# Patient Record
Sex: MALE | Race: WHITE | NOT HISPANIC OR LATINO | Employment: UNEMPLOYED | URBAN - METROPOLITAN AREA
[De-identification: names, ages, dates, MRNs, and addresses within clinical notes are randomized per-mention and may not be internally consistent; named-entity substitution may affect disease eponyms.]

---

## 2018-04-09 ENCOUNTER — HOSPITAL ENCOUNTER (EMERGENCY)
Facility: HOSPITAL | Age: 38
End: 2018-04-10
Attending: EMERGENCY MEDICINE | Admitting: EMERGENCY MEDICINE

## 2018-04-09 DIAGNOSIS — F10.920 ALCOHOLIC INTOXICATION WITHOUT COMPLICATION (HCC): Primary | ICD-10-CM

## 2018-04-09 DIAGNOSIS — F32.A DEPRESSION, UNSPECIFIED DEPRESSION TYPE: ICD-10-CM

## 2018-04-09 LAB
ALBUMIN SERPL-MCNC: 5.4 G/DL (ref 3.5–5.2)
ALBUMIN/GLOB SERPL: 1.7 G/DL
ALP SERPL-CCNC: 103 U/L (ref 39–117)
ALT SERPL W P-5'-P-CCNC: 40 U/L (ref 1–41)
AMPHET+METHAMPHET UR QL: NEGATIVE
ANION GAP SERPL CALCULATED.3IONS-SCNC: 15.6 MMOL/L
AST SERPL-CCNC: 28 U/L (ref 1–40)
BARBITURATES UR QL SCN: NEGATIVE
BASOPHILS # BLD AUTO: 0.02 10*3/MM3 (ref 0–0.2)
BASOPHILS NFR BLD AUTO: 0.2 % (ref 0–1.5)
BENZODIAZ UR QL SCN: NEGATIVE
BILIRUB SERPL-MCNC: 0.3 MG/DL (ref 0.1–1.2)
BUN BLD-MCNC: 10 MG/DL (ref 6–20)
BUN/CREAT SERPL: 11.6 (ref 7–25)
CALCIUM SPEC-SCNC: 9.8 MG/DL (ref 8.6–10.5)
CANNABINOIDS SERPL QL: NEGATIVE
CHLORIDE SERPL-SCNC: 103 MMOL/L (ref 98–107)
CO2 SERPL-SCNC: 29.4 MMOL/L (ref 22–29)
COCAINE UR QL: NEGATIVE
CREAT BLD-MCNC: 0.86 MG/DL (ref 0.76–1.27)
DEPRECATED RDW RBC AUTO: 42.6 FL (ref 37–54)
EOSINOPHIL # BLD AUTO: 0.06 10*3/MM3 (ref 0–0.7)
EOSINOPHIL NFR BLD AUTO: 0.7 % (ref 0.3–6.2)
ERYTHROCYTE [DISTWIDTH] IN BLOOD BY AUTOMATED COUNT: 12.9 % (ref 11.5–14.5)
ETHANOL BLD-MCNC: 298 MG/DL (ref 0–10)
ETHANOL UR QL: 0.3 %
GFR SERPL CREATININE-BSD FRML MDRD: 101 ML/MIN/1.73
GLOBULIN UR ELPH-MCNC: 3.2 GM/DL
GLUCOSE BLD-MCNC: 113 MG/DL (ref 65–99)
HCT VFR BLD AUTO: 49.3 % (ref 40.4–52.2)
HGB BLD-MCNC: 16.7 G/DL (ref 13.7–17.6)
IMM GRANULOCYTES # BLD: 0.05 10*3/MM3 (ref 0–0.03)
IMM GRANULOCYTES NFR BLD: 0.6 % (ref 0–0.5)
LYMPHOCYTES # BLD AUTO: 1.7 10*3/MM3 (ref 0.9–4.8)
LYMPHOCYTES NFR BLD AUTO: 19.7 % (ref 19.6–45.3)
MAGNESIUM SERPL-MCNC: 2.7 MG/DL (ref 1.6–2.6)
MCH RBC QN AUTO: 30.9 PG (ref 27–32.7)
MCHC RBC AUTO-ENTMCNC: 33.9 G/DL (ref 32.6–36.4)
MCV RBC AUTO: 91.3 FL (ref 79.8–96.2)
METHADONE UR QL SCN: NEGATIVE
MONOCYTES # BLD AUTO: 0.36 10*3/MM3 (ref 0.2–1.2)
MONOCYTES NFR BLD AUTO: 4.2 % (ref 5–12)
NEUTROPHILS # BLD AUTO: 6.43 10*3/MM3 (ref 1.9–8.1)
NEUTROPHILS NFR BLD AUTO: 74.6 % (ref 42.7–76)
OPIATES UR QL: NEGATIVE
OXYCODONE UR QL SCN: NEGATIVE
PLATELET # BLD AUTO: 200 10*3/MM3 (ref 140–500)
PMV BLD AUTO: 10.1 FL (ref 6–12)
POTASSIUM BLD-SCNC: 4.1 MMOL/L (ref 3.5–5.2)
PROT SERPL-MCNC: 8.6 G/DL (ref 6–8.5)
RBC # BLD AUTO: 5.4 10*6/MM3 (ref 4.6–6)
SODIUM BLD-SCNC: 148 MMOL/L (ref 136–145)
WBC NRBC COR # BLD: 8.62 10*3/MM3 (ref 4.5–10.7)

## 2018-04-09 PROCEDURE — 80307 DRUG TEST PRSMV CHEM ANLYZR: CPT | Performed by: PHYSICIAN ASSISTANT

## 2018-04-09 PROCEDURE — 25010000002 MAGNESIUM SULFATE PER 500 MG OF MAGNESIUM: Performed by: PHYSICIAN ASSISTANT

## 2018-04-09 PROCEDURE — 25010000002 THIAMINE PER 100 MG: Performed by: PHYSICIAN ASSISTANT

## 2018-04-09 PROCEDURE — 80053 COMPREHEN METABOLIC PANEL: CPT | Performed by: PHYSICIAN ASSISTANT

## 2018-04-09 PROCEDURE — 99285 EMERGENCY DEPT VISIT HI MDM: CPT

## 2018-04-09 PROCEDURE — 96365 THER/PROPH/DIAG IV INF INIT: CPT

## 2018-04-09 PROCEDURE — 83735 ASSAY OF MAGNESIUM: CPT | Performed by: PHYSICIAN ASSISTANT

## 2018-04-09 PROCEDURE — 85025 COMPLETE CBC W/AUTO DIFF WBC: CPT | Performed by: PHYSICIAN ASSISTANT

## 2018-04-09 RX ORDER — SODIUM CHLORIDE 0.9 % (FLUSH) 0.9 %
10 SYRINGE (ML) INJECTION AS NEEDED
Status: DISCONTINUED | OUTPATIENT
Start: 2018-04-09 | End: 2018-04-10 | Stop reason: HOSPADM

## 2018-04-09 RX ADMIN — FOLIC ACID 1000 ML/HR: 5 INJECTION, SOLUTION INTRAMUSCULAR; INTRAVENOUS; SUBCUTANEOUS at 22:44

## 2018-04-10 ENCOUNTER — HOSPITAL ENCOUNTER (INPATIENT)
Facility: HOSPITAL | Age: 38
LOS: 4 days | Discharge: HOME | End: 2018-04-14
Attending: SPECIALIST | Admitting: SPECIALIST

## 2018-04-10 VITALS
TEMPERATURE: 97.3 F | BODY MASS INDEX: 28.63 KG/M2 | OXYGEN SATURATION: 93 % | HEART RATE: 98 BPM | SYSTOLIC BLOOD PRESSURE: 138 MMHG | HEIGHT: 70 IN | RESPIRATION RATE: 18 BRPM | WEIGHT: 200 LBS | DIASTOLIC BLOOD PRESSURE: 98 MMHG

## 2018-04-10 PROBLEM — F32.A DEPRESSION WITH SUICIDAL IDEATION: Status: ACTIVE | Noted: 2018-04-10

## 2018-04-10 PROBLEM — R45.851 SUICIDAL IDEATIONS: Status: ACTIVE | Noted: 2018-04-10

## 2018-04-10 PROBLEM — R45.851 DEPRESSION WITH SUICIDAL IDEATION: Status: ACTIVE | Noted: 2018-04-10

## 2018-04-10 LAB
BILIRUB UR QL STRIP: NEGATIVE
CHOLEST SERPL-MCNC: 222 MG/DL (ref 0–200)
CLARITY UR: CLEAR
COLOR UR: YELLOW
ETHANOL BLD-MCNC: 121 MG/DL (ref 0–10)
ETHANOL UR QL: 0.12 %
GLUCOSE UR STRIP-MCNC: NEGATIVE MG/DL
HDLC SERPL-MCNC: 37 MG/DL (ref 40–60)
HGB UR QL STRIP.AUTO: NEGATIVE
KETONES UR QL STRIP: NEGATIVE
LDLC SERPL CALC-MCNC: 133 MG/DL (ref 0–100)
LDLC/HDLC SERPL: 3.6 {RATIO}
LEUKOCYTE ESTERASE UR QL STRIP.AUTO: NEGATIVE
NITRITE UR QL STRIP: NEGATIVE
PH UR STRIP.AUTO: <=5 [PH] (ref 5–8)
PROT UR QL STRIP: NEGATIVE
SP GR UR STRIP: 1.01 (ref 1–1.03)
T4 FREE SERPL-MCNC: 1.08 NG/DL (ref 0.93–1.7)
TRIGL SERPL-MCNC: 259 MG/DL (ref 0–150)
TSH SERPL DL<=0.05 MIU/L-ACNC: 0.61 MIU/ML (ref 0.27–4.2)
UROBILINOGEN UR QL STRIP: NORMAL
VLDLC SERPL-MCNC: 51.8 MG/DL (ref 5–40)

## 2018-04-10 PROCEDURE — 80307 DRUG TEST PRSMV CHEM ANLYZR: CPT | Performed by: PHYSICIAN ASSISTANT

## 2018-04-10 PROCEDURE — 84439 ASSAY OF FREE THYROXINE: CPT | Performed by: SPECIALIST

## 2018-04-10 PROCEDURE — 84443 ASSAY THYROID STIM HORMONE: CPT | Performed by: SPECIALIST

## 2018-04-10 PROCEDURE — 81003 URINALYSIS AUTO W/O SCOPE: CPT | Performed by: SPECIALIST

## 2018-04-10 PROCEDURE — 90791 PSYCH DIAGNOSTIC EVALUATION: CPT

## 2018-04-10 PROCEDURE — 80061 LIPID PANEL: CPT | Performed by: SPECIALIST

## 2018-04-10 RX ORDER — THIAMINE MONONITRATE (VIT B1) 100 MG
200 TABLET ORAL DAILY
Status: COMPLETED | OUTPATIENT
Start: 2018-04-10 | End: 2018-04-12

## 2018-04-10 RX ORDER — FOLIC ACID 1 MG/1
1 TABLET ORAL DAILY
Status: COMPLETED | OUTPATIENT
Start: 2018-04-10 | End: 2018-04-12

## 2018-04-10 RX ORDER — SERTRALINE HYDROCHLORIDE 100 MG/1
100 TABLET, FILM COATED ORAL DAILY
Status: DISPENSED | OUTPATIENT
Start: 2018-04-10 | End: 2018-04-12

## 2018-04-10 RX ORDER — TRAZODONE HYDROCHLORIDE 50 MG/1
50 TABLET ORAL NIGHTLY
Status: DISCONTINUED | OUTPATIENT
Start: 2018-04-10 | End: 2018-04-14 | Stop reason: HOSPADM

## 2018-04-10 RX ORDER — ACETAMINOPHEN 325 MG/1
650 TABLET ORAL EVERY 6 HOURS PRN
Status: DISCONTINUED | OUTPATIENT
Start: 2018-04-10 | End: 2018-04-14 | Stop reason: HOSPADM

## 2018-04-10 RX ORDER — SERTRALINE HYDROCHLORIDE 100 MG/1
100 TABLET, FILM COATED ORAL DAILY
COMMUNITY
End: 2018-04-14 | Stop reason: HOSPADM

## 2018-04-10 RX ORDER — MULTIPLE VITAMINS W/ MINERALS TAB 9MG-400MCG
1 TAB ORAL DAILY
Status: DISPENSED | OUTPATIENT
Start: 2018-04-10 | End: 2018-04-13

## 2018-04-10 RX ORDER — ALUMINA, MAGNESIA, AND SIMETHICONE 2400; 2400; 240 MG/30ML; MG/30ML; MG/30ML
15 SUSPENSION ORAL EVERY 6 HOURS PRN
Status: DISCONTINUED | OUTPATIENT
Start: 2018-04-10 | End: 2018-04-14 | Stop reason: HOSPADM

## 2018-04-10 RX ORDER — ACETAMINOPHEN 500 MG
1000 TABLET ORAL ONCE
Status: COMPLETED | OUTPATIENT
Start: 2018-04-10 | End: 2018-04-10

## 2018-04-10 RX ORDER — LORAZEPAM 1 MG/1
2 TABLET ORAL
Status: DISCONTINUED | OUTPATIENT
Start: 2018-04-10 | End: 2018-04-14 | Stop reason: HOSPADM

## 2018-04-10 RX ADMIN — Medication 200 MG: at 16:35

## 2018-04-10 RX ADMIN — ACETAMINOPHEN 1000 MG: 500 TABLET, FILM COATED ORAL at 03:12

## 2018-04-10 RX ADMIN — FOLIC ACID 1 MG: 1 TABLET ORAL at 16:32

## 2018-04-10 RX ADMIN — TRAZODONE HYDROCHLORIDE 50 MG: 50 TABLET ORAL at 22:48

## 2018-04-10 RX ADMIN — MULTIPLE VITAMINS W/ MINERALS TAB 1 TABLET: TAB at 16:32

## 2018-04-10 NOTE — ED PROVIDER NOTES
EMERGENCY DEPARTMENT ENCOUNTER    CHIEF COMPLAINT  Chief Complaint: SI  History given by: Pt  History limited by: none  Room Number: 41/41  PMD: Junior Davalos MD      HPI:  Pt is a 37 y.o. male who presents complaining of SI that began 25 years ago but has not gotten worse. He states he has never made a plan. Pt reports stress in his life from college. Pt is intoxicated and states he wants help with his alcohol addiction that has been ongoing. He has had periods of sobriety lasting up to 4 years but has relapsed recently.    Duration:  25 years ago  Timing: intermittent  Intensity/Severity: moderate  Progression: none stated  Aggravating Factors: stress from college  Alleviating Factors: none stated  Previous Episodes: Hx of SI and alcoholism  Treatment before arrival: none stated    PAST MEDICAL HISTORY  Active Ambulatory Problems     Diagnosis Date Noted   • Mood disorder    • Depression    • Generalized headaches    • Chest pain    • Palpitations      Resolved Ambulatory Problems     Diagnosis Date Noted   • No Resolved Ambulatory Problems     Past Medical History:   Diagnosis Date   • Chest pain    • Depression    • Generalized headaches    • Mood disorder    • Palpitations    • PV (pityriasis versicolor)        PAST SURGICAL HISTORY  Past Surgical History:   Procedure Laterality Date   • APPENDECTOMY N/A 11/09/2009    Dr. Paulina Titus       FAMILY HISTORY  Family History   Problem Relation Age of Onset   • Hypertension Mother    • ALS Paternal Grandfather        SOCIAL HISTORY  Social History     Social History   • Marital status:      Spouse name: N/A   • Number of children: N/A   • Years of education: N/A     Occupational History   • Not on file.     Social History Main Topics   • Smoking status: Former Smoker   • Smokeless tobacco: Never Used   • Alcohol use Yes      Comment: 1/2 pint daily   • Drug use: Unknown   • Sexual activity: Defer     Other Topics Concern   • Not on file     Social  History Narrative   • No narrative on file       ALLERGIES  Review of patient's allergies indicates no known allergies.    REVIEW OF SYSTEMS  Review of Systems   Constitutional: Negative for activity change, appetite change and fever.        Intoxicated   HENT: Negative for congestion and sore throat.    Eyes: Negative.    Respiratory: Negative for cough and shortness of breath.    Cardiovascular: Negative for chest pain and leg swelling.   Gastrointestinal: Negative for abdominal pain, diarrhea and vomiting.   Endocrine: Negative.    Genitourinary: Negative for decreased urine volume and dysuria.   Musculoskeletal: Negative for neck pain.   Skin: Negative for rash and wound.   Allergic/Immunologic: Negative.    Neurological: Negative for weakness, numbness and headaches.   Hematological: Negative.    Psychiatric/Behavioral: Positive for suicidal ideas.   All other systems reviewed and are negative.      PHYSICAL EXAM  ED Triage Vitals   Temp Heart Rate Resp BP SpO2   04/09/18 2039 04/09/18 2039 04/09/18 2039 04/09/18 2056 04/09/18 2039   97.3 °F (36.3 °C) 120 18 133/93 96 %      Temp src Heart Rate Source Patient Position BP Location FiO2 (%)   04/09/18 2039 -- 04/09/18 2056 04/09/18 2056 --   Tympanic  Sitting Right arm        Physical Exam   Constitutional: He is oriented to person, place, and time and well-developed, well-nourished, and in no distress.   Smells like alcohol  Non-tremorous    HENT:   Head: Normocephalic and atraumatic.   Eyes: EOM are normal. Pupils are equal, round, and reactive to light.   Neck: Normal range of motion. Neck supple.   Cardiovascular: Normal rate, regular rhythm and normal heart sounds.    Pulmonary/Chest: Effort normal and breath sounds normal. No respiratory distress.   Abdominal: Soft. There is no tenderness. There is no rebound and no guarding.   Musculoskeletal: Normal range of motion. He exhibits no edema.   Neurological: He is alert and oriented to person, place, and time.  He has normal sensation and normal strength.   Skin: Skin is warm and dry.   Psychiatric: Mood and affect normal.   Nursing note and vitals reviewed.      LAB RESULTS  Lab Results (last 24 hours)     Procedure Component Value Units Date/Time    Urine Drug Screen - Urine, Clean Catch [[id number]]  (Normal) Collected:  04/09/18 2222    Specimen:  Urine from Urine, Clean Catch Updated:  04/09/18 2316     Amphet/Methamphet, Screen Negative     Barbiturates Screen, Urine Negative     Benzodiazepine Screen, Urine Negative     Cocaine Screen, Urine Negative     Opiate Screen Negative     THC, Screen, Urine Negative     Methadone Screen, Urine Negative     Oxycodone Screen, Urine Negative    Narrative:       Negative Thresholds For Drugs Screened:     Amphetamines               500 ng/ml   Barbiturates               200 ng/ml   Benzodiazepines            100 ng/ml   Cocaine                    300 ng/ml   Methadone                  300 ng/ml   Opiates                    300 ng/ml   Oxycodone                  100 ng/ml   THC                        50 ng/ml    The Normal Value for all drugs tested is negative. This report includes final unconfirmed screening results to be used for medical treatment purposes only. Unconfirmed results must not be used for non-medical purposes such as employment or legal testing. Clinical consideration should be applied to any drug of abuse test, particulary when unconfirmed results are used.    CBC & Differential [[id number]] Collected:  04/09/18 2229    Specimen:  Blood Updated:  04/09/18 2304    Narrative:       The following orders were created for panel order CBC & Differential.  Procedure                               Abnormality         Status                     ---------                               -----------         ------                     CBC Auto Differential[[id number]]        Abnormal            Final result                 Please view results for these tests on the individual  orders.    Comprehensive Metabolic Panel [[id number]]  (Abnormal) Collected:  04/09/18 2229    Specimen:  Blood Updated:  04/09/18 2314     Glucose 113 (H) mg/dL      BUN 10 mg/dL      Creatinine 0.86 mg/dL      Sodium 148 (H) mmol/L      Potassium 4.1 mmol/L      Chloride 103 mmol/L      CO2 29.4 (H) mmol/L      Calcium 9.8 mg/dL      Total Protein 8.6 (H) g/dL      Albumin 5.40 (H) g/dL      ALT (SGPT) 40 U/L      AST (SGOT) 28 U/L      Alkaline Phosphatase 103 U/L      Total Bilirubin 0.3 mg/dL      eGFR Non African Amer 101 mL/min/1.73      Globulin 3.2 gm/dL      A/G Ratio 1.7 g/dL      BUN/Creatinine Ratio 11.6     Anion Gap 15.6 mmol/L     Magnesium [[id number]]  (Abnormal) Collected:  04/09/18 2229    Specimen:  Blood Updated:  04/09/18 2314     Magnesium 2.7 (H) mg/dL     Ethanol [[id number]]  (Abnormal) Collected:  04/09/18 2229    Specimen:  Blood Updated:  04/09/18 2321     Ethanol 298 (C) mg/dL      Ethanol % 0.298 %     CBC Auto Differential [[id number]]  (Abnormal) Collected:  04/09/18 2229    Specimen:  Blood Updated:  04/09/18 2304     WBC 8.62 10*3/mm3      RBC 5.40 10*6/mm3      Hemoglobin 16.7 g/dL      Hematocrit 49.3 %      MCV 91.3 fL      MCH 30.9 pg      MCHC 33.9 g/dL      RDW 12.9 %      RDW-SD 42.6 fl      MPV 10.1 fL      Platelets 200 10*3/mm3      Neutrophil % 74.6 %      Lymphocyte % 19.7 %      Monocyte % 4.2 (L) %      Eosinophil % 0.7 %      Basophil % 0.2 %      Immature Grans % 0.6 (H) %      Neutrophils, Absolute 6.43 10*3/mm3      Lymphocytes, Absolute 1.70 10*3/mm3      Monocytes, Absolute 0.36 10*3/mm3      Eosinophils, Absolute 0.06 10*3/mm3      Basophils, Absolute 0.02 10*3/mm3      Immature Grans, Absolute 0.05 (H) 10*3/mm3     Ethanol [[id number]] Collected:  04/10/18 0613    Specimen:  Blood from Arm, Left Updated:  04/10/18 0629          I ordered the above labs and reviewed the results    RADIOLOGY  No orders to display        I ordered the above noted radiological  studies. Interpreted by radiologist. Reviewed by me in PACS.       PROCEDURES  Procedures      PROGRESS AND CONSULTS  ED Course   10:10 PM  Ordered blood work and vitamins.    12:17 AM  BP- 126/86 HR- 120 Temp- 97.3 °F (36.3 °C) (Tympanic) O2 sat- 97%  Rechecked the patient who is in NAD and is resting comfortably.     3:07 AM   Ordered tylenol.    4:00 AM  Ordered ethanol test to be drawn at 6:00 AM    6:00 AM  Care turned over to SAVANA Bar pending ethanol test, ACCESS consult, and disposition.  MEDICAL DECISION MAKING  Results were reviewed/discussed with the patient and they were also made aware of online access. Pt also made aware that some labs, such as cultures, will not be resulted during ER visit and follow up with PMD is necessary.     MDM  Number of Diagnoses or Management Options     Amount and/or Complexity of Data Reviewed  Clinical lab tests: ordered and reviewed (Ethanol-298  Magnesium 2.7 (H) )           DIAGNOSIS  Final diagnoses:   Alcoholic intoxication without complication   Depression, unspecified depression type       DISPOSITION  Care turned over to SAVANA Bar    Latest Documented Vital Signs:  As of 6:36 AM  BP- 104/65 HR- 83 Temp- 97.3 °F (36.3 °C) (Tympanic) O2 sat- 91%    --  Documentation assistance provided by madelaine Stauffer for Ken Lopez PA-C.  Information recorded by the scribe was done at my direction and has been verified and validated by me.         Wagner Stauffer  04/10/18 0522       ERIC Zeng III  04/10/18 0636

## 2018-04-10 NOTE — CONSULTS
Inpatient Hospitalist Consult  Consult performed by: DANAE العلي  Consult ordered by: PEARL COTE III          Patient Care Team:  Junior Davalos MD as PCP - General (Internal Medicine)    Chief complaint: Suicidal ideations    Subjective     History of Present Illness    37-year-old gentleman who comes to the emergency room at [facility] accompanied by his mother acutely intoxicated and expressing thoughts of hurting himself.  Patient is currently enrolled in college and a significant graduate the next couple weeks.  Patient is requesting help for alcohol addiction.  He's had periods of sobriety lasting 4 years.    Review of Systems   Constitutional: Negative for activity change and appetite change.   HENT: Negative for dental problem, postnasal drip and rhinorrhea.    Respiratory: Negative for apnea and shortness of breath.    Cardiovascular: Negative for chest pain and palpitations.   Gastrointestinal: Negative for abdominal distention and abdominal pain.   Endocrine: Negative for cold intolerance and polydipsia.   Genitourinary: Negative for difficulty urinating and dysuria.   Musculoskeletal: Negative for arthralgias.   Skin: Negative for color change.   Neurological: Negative for dizziness and numbness.   Hematological: Negative for adenopathy.   Psychiatric/Behavioral: Negative for agitation and behavioral problems.        Past Medical History:   Diagnosis Date   • Alcohol abuse    • Alcoholism    • Chest pain    • Depression    • Generalized headaches    • Mood disorder    • Palpitations    • PV (pityriasis versicolor)    ,   Past Surgical History:   Procedure Laterality Date   • APPENDECTOMY N/A 11/09/2009    Dr. Paulina Titus   ,   Family History   Problem Relation Age of Onset   • Hypertension Mother    • Alcohol abuse Mother    • Alcohol abuse Father    • Bipolar disorder Father    • ALS Paternal Grandfather    • Alcohol abuse Paternal Grandfather    • Depression Paternal  Grandfather    • Alcohol abuse Brother    • Suicide Attempts Other    ,   Social History   Substance Use Topics   • Smoking status: Current Some Day Smoker     Types: Cigarettes   • Smokeless tobacco: Never Used      Comment: Smokes a few cigarettes a day   • Alcohol use Yes      Comment: 1/2 pint daily-fith vodka daily   ,   Prescriptions Prior to Admission   Medication Sig Dispense Refill Last Dose   • sertraline (ZOLOFT) 100 MG tablet Take 100 mg by mouth Daily.      , Scheduled Meds:    folic acid 1 mg Oral Daily   multivitamin with minerals 1 tablet Oral Daily   sertraline 100 mg Oral Daily   thiamine 200 mg Oral Daily   , Continuous Infusions:   , PRN Meds:  •  acetaminophen  •  aluminum-magnesium hydroxide-simethicone  •  LORazepam  •  magnesium hydroxide and Allergies:  Review of patient's allergies indicates no known allergies.    Objective      Vital Signs  Temp:  [97.3 °F (36.3 °C)-98.4 °F (36.9 °C)] 98.2 °F (36.8 °C)  Heart Rate:  [] 73  Resp:  [18-20] 18  BP: ()/(54-99) 124/74    Physical Exam   Constitutional: He appears well-developed and well-nourished.   HENT:   Head: Normocephalic and atraumatic.   Cardiovascular: Normal rate and regular rhythm.    No murmur heard.  Pulmonary/Chest: Effort normal and breath sounds normal.   Abdominal: Soft. Bowel sounds are normal. He exhibits no distension. There is no tenderness.   Neurological: He is alert.   Skin: Skin is warm and dry.       Results Review:    I reviewed the patient's new clinical results.        Assessment/Plan     Active Problems:    Mood disorder    Suicidal ideations  etoh abuse    Assessment & Plan    Laboratory tests reviewed.  Sodium level is high we will recheck this in the morning.  Patient also has slightly elevated LDL which will need to be followed up outpatient.    -check bmp in the am    I discussed the patients findings and my recommendations with patient    Tacho Lopez MD  04/10/18  5:33 PM    Time:        Speaking Coherently

## 2018-04-10 NOTE — CONSULTS
A 38 yo white male seen in ED rm # 41 accompanied by his mother. Pt at home last night, intoxicated and expressing thoughts of not wanting to be alive anymore so wife called EMS. Pt tells me he has relapsed on alcohol and having a hard time not drinking, dealing with depression a long time and just doesn't want to be alive anymore. Pt states his main stressors are school and family. Pt is suppose to graduate from [employer] at [employer] in 2 1/2 wks and because of his alcohol use has been missing some classes. Problems in relationship with his wife of 8 yrs. Wife is very controlling and puts everything ahead of him. They have 2 children 3 and 5 and he feels his wife undermines his ability to be effective in parenting and disciplining the children. They have been in couples counseling and she focuses only on this last yr that he has relapsed on alcohol and none of their other problems. Pt thinks his wife is going to kick him out and leave him.  Pt states started drinking in  and about age 21 it became a problem. Pt had 4 yrs sober in June 2017 and then relapsed. Has been drinking off and on since, not daily, usually about a pint a day but yesterday drank a fifth of Vodka. Pt's BAL last night at 2229 was 298. Pt has had inpt detox once at [facility] and [facility] twice at [facility] with the last being December 2017. His usual withdrawal sx are tremors and dry heaves. Pt has had 2 DUI's 13 and 15 yrs ago. Has recently attended AA and has a sponsor. Used to be a heavy marijuana user but hasn't used in a while. In the past has tried cocaine, ecstasy and mushrooms.  Pt has never attempted to harm himself but over the last 24 hrs has been thinking about it more and can't make the thoughts go away. Has not thought of a specific way to harm self. Pt states he can't stop drinking and wants to die. States he doesn't sleep much, appetite okay.  I talked with his mother and she is concerned about his safety if he goes home. Mother,  father and brother have history of alcohol problems. Pt's father diagnose with bipolar. Pt's great grandfather committed suicide.  Discussed with Dr. Alexandra and will admit to [facility], orders received.  Told Iris RUBIO of plan to admit.

## 2018-04-10 NOTE — ED PROVIDER NOTES
EMERGENCY DEPARTMENT ENCOUNTER    Room number:  41/41  Date Seen:  4/10/2018  Time of transfer: 0600  PCP:  Junior Davalos MD    Labs:  Results for orders placed or performed during the hospital encounter of 04/09/18   Comprehensive Metabolic Panel   Result Value Ref Range    Glucose 113 (H) 65 - 99 mg/dL    BUN 10 6 - 20 mg/dL    Creatinine 0.86 0.76 - 1.27 mg/dL    Sodium 148 (H) 136 - 145 mmol/L    Potassium 4.1 3.5 - 5.2 mmol/L    Chloride 103 98 - 107 mmol/L    CO2 29.4 (H) 22.0 - 29.0 mmol/L    Calcium 9.8 8.6 - 10.5 mg/dL    Total Protein 8.6 (H) 6.0 - 8.5 g/dL    Albumin 5.40 (H) 3.50 - 5.20 g/dL    ALT (SGPT) 40 1 - 41 U/L    AST (SGOT) 28 1 - 40 U/L    Alkaline Phosphatase 103 39 - 117 U/L    Total Bilirubin 0.3 0.1 - 1.2 mg/dL    eGFR Non African Amer 101 >60 mL/min/1.73    Globulin 3.2 gm/dL    A/G Ratio 1.7 g/dL    BUN/Creatinine Ratio 11.6 7.0 - 25.0    Anion Gap 15.6 mmol/L   Magnesium   Result Value Ref Range    Magnesium 2.7 (H) 1.6 - 2.6 mg/dL   Urine Drug Screen - Urine, Clean Catch   Result Value Ref Range    Amphet/Methamphet, Screen Negative Negative    Barbiturates Screen, Urine Negative Negative    Benzodiazepine Screen, Urine Negative Negative    Cocaine Screen, Urine Negative Negative    Opiate Screen Negative Negative    THC, Screen, Urine Negative Negative    Methadone Screen, Urine Negative Negative    Oxycodone Screen, Urine Negative Negative   Ethanol   Result Value Ref Range    Ethanol 298 (C) 0 - 10 mg/dL    Ethanol % 0.298 %   CBC Auto Differential   Result Value Ref Range    WBC 8.62 4.50 - 10.70 10*3/mm3    RBC 5.40 4.60 - 6.00 10*6/mm3    Hemoglobin 16.7 13.7 - 17.6 g/dL    Hematocrit 49.3 40.4 - 52.2 %    MCV 91.3 79.8 - 96.2 fL    MCH 30.9 27.0 - 32.7 pg    MCHC 33.9 32.6 - 36.4 g/dL    RDW 12.9 11.5 - 14.5 %    RDW-SD 42.6 37.0 - 54.0 fl    MPV 10.1 6.0 - 12.0 fL    Platelets 200 140 - 500 10*3/mm3    Neutrophil % 74.6 42.7 - 76.0 %    Lymphocyte % 19.7 19.6 - 45.3 %     Monocyte % 4.2 (L) 5.0 - 12.0 %    Eosinophil % 0.7 0.3 - 6.2 %    Basophil % 0.2 0.0 - 1.5 %    Immature Grans % 0.6 (H) 0.0 - 0.5 %    Neutrophils, Absolute 6.43 1.90 - 8.10 10*3/mm3    Lymphocytes, Absolute 1.70 0.90 - 4.80 10*3/mm3    Monocytes, Absolute 0.36 0.20 - 1.20 10*3/mm3    Eosinophils, Absolute 0.06 0.00 - 0.70 10*3/mm3    Basophils, Absolute 0.02 0.00 - 0.20 10*3/mm3    Immature Grans, Absolute 0.05 (H) 0.00 - 0.03 10*3/mm3   Ethanol   Result Value Ref Range    Ethanol 121 (H) 0 - 10 mg/dL    Ethanol % 0.121 %       Medications ordered in ED:  Medications   sodium chloride 0.9 % flush 10 mL (not administered)   multiple vitamin (M.V.I. Adult) 10 mL, thiamine (B-1) 100 mg, folic acid 1 mg, magnesium sulfate 2 g in sodium chloride 0.9 % 1,000 mL infusion (0 mL/hr Intravenous Stopped 4/9/18 2350)   acetaminophen (TYLENOL) tablet 1,000 mg (1,000 mg Oral Given 4/10/18 0312)       Progress and Consult Notes:  0600-  Patient care transferred from ERIC Lopez, pending repeat BAL and Access evaluation.    0613- Repeat BAL is 121.     0900- Discussed case with Access RN  Reviewed history, exam, results and treatments.  Discussed concerns and plan of care. Access RN has seen and evaluated pt in ED and accepts pt to be admitted to Dr Alexandra (psychiatrist) at [facility] for further care.      ADMISSION    Diagnosis:  Final diagnoses:   Alcoholic intoxication without complication   Depression, unspecified depression type       Provider attestation:  I personally reviewed the past medical history, past surgical history, social history, family history, current medications, and allergies as they appear in the chart.    Documentation assistance provided by madelaine Alvarez for SAVANA Juárez. Information recorded by the scriblakisha was done at my direction and has been verified and validated by me.        Jeri Alvarez  04/10/18 7812       SAVANA Romero  04/10/18 4616

## 2018-04-10 NOTE — ED PROVIDER NOTES
"Pt presents to the ED seeking help for his EtOH use. Pt also reports SI and states \"I'd rather be dead than be alive.\"    Physical Exam:  Pt smells of EtOH  Lungs: CTAB  Cardiovascular: RRR  Abdominal: soft, nontender  Neuro: normal exam    Plan to evaluate the pt's labs and consult access.    MD ATTESTATION NOTE    The SUSSY and I have discussed this patient's history, physical exam, and treatment plan.  I have reviewed the documentation and personally had a face to face interaction with the patient. I affirm the documentation and agree with the treatment and plan.  The attached note describes my personal findings.    Documentation assistance provided by madelaine Thornton for Dr. Dejesus.  Information recorded by the madelaine was done at my direction and has been verified and validated by me.       Carlitos Thornton  04/09/18 3879       Salinas Dejesus MD  04/10/18 3148    "

## 2018-04-10 NOTE — ED NOTES
Pt sleeping in no obvious distress. Per access, pt will need a repeat ETOH level at 0530.     Malaika Mcdonald RN  04/10/18 0352

## 2018-04-10 NOTE — ED NOTES
Pt sleeping in no obvious distress. VSS awaiting 6am repeat ETOH.     Malaika Mcdonald RN  04/10/18 0555

## 2018-04-10 NOTE — ED NOTES
Pt states he is here for help to stop drinking. Pt relapsed after 3 years of sobriety. States he has had thoughts of wanting to die, but has no specific plan to do so.     Malaika Mcdonald RN  04/09/18 4911

## 2018-04-11 LAB
ANION GAP SERPL CALCULATED.3IONS-SCNC: 14 MMOL/L
BUN BLD-MCNC: 13 MG/DL (ref 6–20)
BUN/CREAT SERPL: 12.4 (ref 7–25)
CALCIUM SPEC-SCNC: 9.6 MG/DL (ref 8.6–10.5)
CHLORIDE SERPL-SCNC: 101 MMOL/L (ref 98–107)
CO2 SERPL-SCNC: 25 MMOL/L (ref 22–29)
CREAT BLD-MCNC: 1.05 MG/DL (ref 0.76–1.27)
GFR SERPL CREATININE-BSD FRML MDRD: 80 ML/MIN/1.73
GLUCOSE BLD-MCNC: 97 MG/DL (ref 65–99)
POTASSIUM BLD-SCNC: 4 MMOL/L (ref 3.5–5.2)
SODIUM BLD-SCNC: 140 MMOL/L (ref 136–145)

## 2018-04-11 PROCEDURE — 80048 BASIC METABOLIC PNL TOTAL CA: CPT | Performed by: HOSPITALIST

## 2018-04-11 RX ORDER — ACAMPROSATE CALCIUM 333 MG/1
666 TABLET, DELAYED RELEASE ORAL 3 TIMES DAILY
Status: DISCONTINUED | OUTPATIENT
Start: 2018-04-11 | End: 2018-04-14 | Stop reason: HOSPADM

## 2018-04-11 RX ADMIN — SERTRALINE 100 MG: 100 TABLET, FILM COATED ORAL at 08:37

## 2018-04-11 RX ADMIN — ACAMPROSATE CALCIUM ENTERIC-COATED 666 MG: 333 TABLET, DELAYED RELEASE ORAL at 22:55

## 2018-04-11 RX ADMIN — TRAZODONE HYDROCHLORIDE 50 MG: 50 TABLET ORAL at 22:54

## 2018-04-11 RX ADMIN — FOLIC ACID 1 MG: 1 TABLET ORAL at 08:37

## 2018-04-11 RX ADMIN — Medication 200 MG: at 08:37

## 2018-04-11 RX ADMIN — MULTIPLE VITAMINS W/ MINERALS TAB 1 TABLET: TAB at 11:04

## 2018-04-11 RX ADMIN — ACAMPROSATE CALCIUM ENTERIC-COATED 666 MG: 333 TABLET, DELAYED RELEASE ORAL at 15:34

## 2018-04-11 NOTE — H&P
IDENTIFYING INFORMATION: The patient is a 37-year-old  white male admitted after experiencing suicidal thoughts while intoxicated.    CHIEF COMPLAINT:  Thoughts of suicide    INFORMANT:  Patient and chart    RELIABILITY:  Good    HISTORY OF PRESENT ILLNESS: The patient is a 37-year-old white male with a long history of alcohol dependence.  He had had a 4 year period of sobriety but relapsed in June and his struggle with his alcohol dependent sense.  He is drinking up to a fifth of hard liquor on a daily basis.  I asked evening.  She did the patient made suicidal threats and was transported this facility by police.  The patient reports several stressors.  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  He also reports contentious relationship with his wife as a cause for ongoing depression.  The patient is currently prescribed Zoloft 100 mg daily by a nurse practitioner at the [facility].  He has been in chemical dependence treatment on a residential basis proxy 5 years ago at the [facility].  He had more recently done intensive outpatient programming in late 2017.  When seen today the patient's pleasant cooperative and is able to promise safety in the hospital.  He denies current suicidal elation.  He denies prior suicide attempt or gestures.  He did not have particular suicide plan at the time of admission.  He denies recent changes in sleep or appetite.  He does report that he is been more depressed secondary to his inability to quit drinking.    PAST PSYCHIATRIC HISTORY: As above    PAST MEDICAL HISTORY:  Noncontributory    MEDICATIONS: Zoloft    ALLERGIES:  None    FAMILY HISTORY:  The patient reports an extensive family history of alcohol abuse and depression.  His paternal grandfather committed suicide.    SOCIAL HISTORY: The patient lives with his wife and 2 young children.  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  He previously worked in the [employer].  He reports place as noted previously and is a \"occasional smoker.  He denies recent other psychoactive substances.    MENTAL STATUS EXAM: The patient is well-developed well-nourished white male appearing stated age.  He is no apparent physical distress of times examination.  He is awake alert and oriented all spheres.  His mood is dysphoric his affect constricted.  Speech is generally well coherent.  There are no gross sepsis memory cognition noted.  Intelligence is judged to be average range based on fund of knowledge, the patient's cooperative throughout the interview.  He denies current suicidal homicidal ideation or psychotic features.  His judgment and insight appear to be intact.    ASSETS/LIABILITIES: Motivation for change, high level of functioning    DIAGNOSTIC IMPRESSION: Major depressive disorder recurrent moderate, alcohol use disorder    PLAN:  The patient range hospitalized for safety and stabilization.  As he is able to promise safety in the hospital, I will discontinue her sitter and one-to-one precautions.  The patient will persuade in appropriate bruno milieu activities and I will increase his Zoloft dose from 100 mg daily to 150 mg daily.  Additionally, I will add Campral 666 mg 3 times daily to address alcohol craving.  Family and chemical dependence therapy sessions will be recommended.  ELOS is 3-5 days.    "

## 2018-04-11 NOTE — PLAN OF CARE
Problem: Patient Care Overview  Goal: Discharge Needs Assessment  Outcome: Ongoing (interventions implemented as appropriate)   04/10/18 0941 04/11/18 1135 04/11/18 1137   Discharge Needs Assessment   Concerns to be Addressed --  mental health;substance/tobacco abuse/use;suicidal;decision making;coping/stress;relationship;employment/school --    Patient/Family Anticipates Transition to home with family --  --    Patient/Family Anticipated Services at Transition none --  --    Transportation Anticipated family or friend will provide --  --    Discharge Coordination/Progress --  --  Pt will return home. Pt will receive an MARY consult and family session. SW will explore outpt providers for continuity of care.

## 2018-04-11 NOTE — PROGRESS NOTES
Continued Stay Note  [facility]     Patient Name: Tucker Henson  MRN: [id number]  Today's Date: 4/11/2018    Admit Date: 4/10/2018          Discharge Plan     Row Name 04/11/18 1136       Plan    Plan Pt will return home.  Pt will receive an MARY consult and family session.  SW will explore outpt providers for continuity of care.    Patient/Family in Agreement with Plan yes              Discharge Codes    No documentation.           ELZBIETA Lind

## 2018-04-11 NOTE — PLAN OF CARE
Problem: Patient Care Overview  Goal: Plan of Care Review  Outcome: Ongoing (interventions implemented as appropriate)   04/10/18 2005   Coping/Psychosocial   Plan of Care Reviewed With patient   Coping/Psychosocial   Patient Agreement with Plan of Care agrees   Plan of Care Review   Progress no change   OTHER   Outcome Summary PT rates anxiety 7/10, depression 9/10. Denies SI/HI, hallucinations. 1:1 per SP. CIWA scores below 8. Attending groups this afternoon. Pleasant and cooperative. Will continue to monitor behavior and provide support..      Goal: Individualization and Mutuality  Outcome: Ongoing (interventions implemented as appropriate)   04/10/18 2005   Personal Strengths/Vulnerabilities   Patient Personal Strengths expressive of emotions;expressive of needs;motivated for treatment;motivated for recovery;family/social support     Goal: Discharge Needs Assessment  Outcome: Ongoing (interventions implemented as appropriate)    Goal: Interprofessional Rounds/Family Conf  Outcome: Ongoing (interventions implemented as appropriate)

## 2018-04-11 NOTE — PLAN OF CARE
Problem: Patient Care Overview  Goal: Individualization and Mutuality  Outcome: Ongoing (interventions implemented as appropriate)   04/11/18 1004   Personal Strengths/Vulnerabilities   Patient Personal Strengths independent living skills;resourceful     Goal: Interprofessional Rounds/Family Conf  Outcome: Ongoing (interventions implemented as appropriate)   04/11/18 1004   Interdisciplinary Rounds/Family Conf   Participants ;nursing;social work;psychiatrist   Interdisciplinary Rounds/Family Conf   Summary Treatment team met to discuss plan of care. Plan for MARY consult and family session. Patient to possibly attend SUDIOP after discharge.     Patient/Guardian Signature: __________________________________            Psychiatrist Signature: ______________________________________             Therapist Signature: ________________________________________         Nurse Signature: ___________________________________________          Staff Signature: ____________________________________________            Staff Signature: ____________________________________________          Staff Signature: ____________________________________________          Staff Signature:                                                                                                      Problem: Mood Impairment (Depressive Signs/Symptoms) (Adult)  Goal: Improved Mood Symptoms (Depressive Signs/Symptoms)  Outcome: Ongoing (interventions implemented as appropriate)   04/11/18 0759   Improved Mood Symptoms (Depressive Signs/Symptoms)   Improved Mood Symptoms Action Step/Short Term Goal (STG) Established 04/10/18   Improved Mood Symptoms Time Frame for Action Step (STG) 3 days   Improved Mood Symptoms Action Step (STG) Outcome making progress toward outcome       Problem: Feelings of Worthlessness, Hopelessness, Excessive Guilt (Depressive Signs/Symptoms) (Adult)  Goal: Enhanced Self-Esteem/Confidence (Depressive Signs/Symptoms)  Outcome:  Ongoing (interventions implemented as appropriate)   04/11/18 0759   Enhanced Self-Esteem/Confidence (Depressive Signs/Symptoms)   Enhanced Self-Esteem/Confidence Action Step/Short Term Goal (STG) Established 04/10/18   Enhanced Self-Esteem/Confidence Time Frame for Action Step (STG) 3 days   Enhanced Self-Esteem/Confidence Action Step (STG) Outcome making progress toward outcome       Problem: Social/Occupational/Functional Impairment (Depressive Signs/Symptoms) (Adult)  Goal: Improved Social/Occupational/Functional Skills (Depressive Signs/Symptoms)  Outcome: Ongoing (interventions implemented as appropriate)   04/11/18 0759   Improved Social/Occupational/Functional Skills (Depressive Signs/Symptoms)   Improved Social/Occupational/Functional Skills Action Step/Short Term Goal (STG) Established 04/10/18   Improved Social/Occupational/Functional Skills Time Frame for Action Step (STG) 3 days   Improved Social/Occupational/Functional Skills Action Step (STG) Outcome making progress toward outcome       Problem: Suicidal Behavior (Adult)  Goal: Suicidal Behavior is Absent/Minimized/Managed  Outcome: Ongoing (interventions implemented as appropriate)   04/11/18 0759   OTHER   Action Step/Short Term Goal (STG) Established 04/10/18   Suicidal Behavior is Absent/Minimized/Managed   Suicidal Behavior Managed/Minimized Time Frame for Action Step (STG) 3 days   Suicidal Behavior Managed/Minimized Action Step (STG) Outcome making progress toward outcome

## 2018-04-11 NOTE — PLAN OF CARE
Problem: Patient Care Overview  Goal: Plan of Care Review  Outcome: Ongoing (interventions implemented as appropriate)   04/11/18 1827   Coping/Psychosocial   Plan of Care Reviewed With patient   Coping/Psychosocial   Patient Agreement with Plan of Care agrees   Plan of Care Review   Progress improving   OTHER   Outcome Summary Patient is pleasent and has been cooperative with medication and programming. He reported feeling anxiety related to worrying that his wife may leave him. He denied SI/HI and hallucinations and contracted to safety. He is not staging on CIWA. Will continue to monitor and provide support.      Goal: Individualization and Mutuality  Outcome: Ongoing (interventions implemented as appropriate)    Goal: Discharge Needs Assessment  Outcome: Ongoing (interventions implemented as appropriate)    Goal: Interprofessional Rounds/Family Conf  Outcome: Ongoing (interventions implemented as appropriate)      Problem: Overarching Goals (Adult)  Goal: Adheres to Safety Considerations for Self and Others  Outcome: Ongoing (interventions implemented as appropriate)   04/11/18 1827   Overarching Goals (Adult)   Adheres to Safety Considerations for Self and Others making progress toward outcome     Intervention: Develop and Maintain Individualized Safety Plan   04/11/18 0136 04/11/18 0840   C-SSRS (Recent)   Wish to be Dead --  no  (contracts to safety)   Suicidal Thoughts --  no   Suicidal Thought with Method No Plan/Intent --  no   Suicidal Intent (without Specific Plan) --  no   Suicide Intent with Specific Plan --  no   Suicide Behavior --  no   Violence Risk   Feels Like Hurting Others --  no  (contracts to safety)   Previous Attempt to Harm Others no --    Develop and Maintain Individualized Safety Plan   Safety Measures --  safety rounds completed;suicide assessment completed       Goal: Optimized Coping Skills in Response to Life Stressors  Outcome: Ongoing (interventions implemented as appropriate)    04/11/18 1827   Overarching Goals (Adult)   Optimized Coping Skills in Response to Life Stressors making progress toward outcome     Intervention: Promote Effective Coping Strategies   04/11/18 0840   Coping/Psychosocial Interventions   Supportive Measures active listening utilized;positive reinforcement provided;verbalization of feelings encouraged       Goal: Develops/Participates in Therapeutic [place] to Support Successful Transition  Outcome: Ongoing (interventions implemented as appropriate)   04/11/18 1827   Overarching Goals (Adult)   Develops/Participates in Therapeutic [place] to Support Successful Transition making progress toward outcome     Intervention: Foster Therapeutic [place]   04/11/18 0840   Interventions   Trust Relationship/Rapport care explained;choices provided;emotional support provided;empathic listening provided;questions answered;questions encouraged;reassurance provided;thoughts/feelings acknowledged     Intervention: Mutually Develop Transition Plan   04/11/18 0840   Mutually Develop Transition Plan   Transition Support community resources reviewed         Problem: Mood Impairment (Depressive Signs/Symptoms) (Adult)  Intervention: Promote Mood Improvement   04/11/18 1827   Promote Mood Improvement   Mutually Determined Action Steps (Promote Mood Improvement) verbalizes increased insight       Goal: Improved Mood Symptoms (Depressive Signs/Symptoms)  Outcome: Ongoing (interventions implemented as appropriate)   04/11/18 1827   Improved Mood Symptoms (Depressive Signs/Symptoms)   Improved Mood Symptoms Action Step/Short Term Goal (STG) Established 04/10/18   Improved Mood Symptoms Time Frame for Action Step (STG) 3 days   Improved Mood Symptoms Action Step (STG) Outcome making progress toward outcome       Problem: Feelings of Worthlessness, Hopelessness, Excessive Guilt (Depressive Signs/Symptoms) (Adult)  Intervention: Promote Confidence and Self-Esteem   04/11/18 0840    Coping/Psychosocial Interventions   Supportive Measures active listening utilized;positive reinforcement provided;verbalization of feelings encouraged       Goal: Enhanced Self-Esteem/Confidence (Depressive Signs/Symptoms)  Outcome: Ongoing (interventions implemented as appropriate)   04/11/18 1827   Enhanced Self-Esteem/Confidence (Depressive Signs/Symptoms)   Enhanced Self-Esteem/Confidence Action Step/Short Term Goal (STG) Established 04/10/18   Enhanced Self-Esteem/Confidence Time Frame for Action Step (STG) 3 days   Enhanced Self-Esteem/Confidence Action Step (STG) Outcome making progress toward outcome       Problem: Social/Occupational/Functional Impairment (Depressive Signs/Symptoms) (Adult)  Intervention: Promote Social, Occupational and Functional Ability   04/11/18 0840   Interventions   Trust Relationship/Rapport care explained;choices provided;emotional support provided;empathic listening provided;questions answered;questions encouraged;reassurance provided;thoughts/feelings acknowledged       Goal: Improved Social/Occupational/Functional Skills (Depressive Signs/Symptoms)  Outcome: Ongoing (interventions implemented as appropriate)   04/11/18 1827   Improved Social/Occupational/Functional Skills (Depressive Signs/Symptoms)   Improved Social/Occupational/Functional Skills Action Step/Short Term Goal (STG) Established 04/10/18   Improved Social/Occupational/Functional Skills Time Frame for Action Step (STG) 3 days   Improved Social/Occupational/Functional Skills Action Step (STG) Outcome making progress toward outcome       Problem: Suicidal Behavior (Adult)  Intervention: Facilitate Resolution of Suicidal Intent   04/11/18 1827   Facilitate Resolution of Suicidal Intent   Mutually Determined Action Steps (Facilitate Resolution of Suicidal Intent) sets future-oriented goal     Intervention: Provide Immediate/Ongoing Protective Physical Environment   04/11/18 1827   Provide Immediate/Ongoing Protective  Physical Environment   Mutually Determined Action Steps (Provide Immediate/Ongoing Protective Physical Environment) shares suicidal thoughts       Goal: Suicidal Behavior is Absent/Minimized/Managed  Outcome: Ongoing (interventions implemented as appropriate)   04/11/18 1827   OTHER   Action Step/Short Term Goal (STG) Established 04/10/18   Suicidal Behavior is Absent/Minimized/Managed   Suicidal Behavior Managed/Minimized Time Frame for Action Step (STG) 3 days   Suicidal Behavior Managed/Minimized Action Step (STG) Outcome making progress toward outcome

## 2018-04-11 NOTE — PLAN OF CARE
Problem: Mood Impairment (Depressive Signs/Symptoms) (Adult)  Goal: Improved Mood Symptoms (Depressive Signs/Symptoms)  Outcome: Ongoing (interventions implemented as appropriate)   04/11/18 0759   Improved Mood Symptoms (Depressive Signs/Symptoms)   Improved Mood Symptoms Action Step/Short Term Goal (STG) Established 04/10/18   Improved Mood Symptoms Time Frame for Action Step (STG) 3 days   Improved Mood Symptoms Action Step (STG) Outcome making progress toward outcome       Problem: Feelings of Worthlessness, Hopelessness, Excessive Guilt (Depressive Signs/Symptoms) (Adult)  Goal: Enhanced Self-Esteem/Confidence (Depressive Signs/Symptoms)  Outcome: Ongoing (interventions implemented as appropriate)   04/11/18 0759   Enhanced Self-Esteem/Confidence (Depressive Signs/Symptoms)   Enhanced Self-Esteem/Confidence Action Step/Short Term Goal (STG) Established 04/10/18   Enhanced Self-Esteem/Confidence Time Frame for Action Step (STG) 3 days   Enhanced Self-Esteem/Confidence Action Step (STG) Outcome making progress toward outcome       Problem: Social/Occupational/Functional Impairment (Depressive Signs/Symptoms) (Adult)  Goal: Improved Social/Occupational/Functional Skills (Depressive Signs/Symptoms)  Outcome: Ongoing (interventions implemented as appropriate)   04/11/18 0759   Improved Social/Occupational/Functional Skills (Depressive Signs/Symptoms)   Improved Social/Occupational/Functional Skills Action Step/Short Term Goal (STG) Established 04/10/18   Improved Social/Occupational/Functional Skills Time Frame for Action Step (STG) 3 days   Improved Social/Occupational/Functional Skills Action Step (STG) Outcome making progress toward outcome       Problem: Suicidal Behavior (Adult)  Goal: Suicidal Behavior is Absent/Minimized/Managed  Outcome: Ongoing (interventions implemented as appropriate)   04/11/18 0759   OTHER   Action Step/Short Term Goal (STG) Established 04/10/18   Suicidal Behavior is  Absent/Minimized/Managed   Suicidal Behavior Managed/Minimized Time Frame for Action Step (STG) 3 days   Suicidal Behavior Managed/Minimized Action Step (STG) Outcome making progress toward outcome

## 2018-04-11 NOTE — PLAN OF CARE
Problem: Patient Care Overview  Goal: Plan of Care Review  Outcome: Ongoing (interventions implemented as appropriate)   04/11/18 0136 04/11/18 0514   Coping/Psychosocial   Plan of Care Reviewed With patient --    Coping/Psychosocial   Patient Agreement with Plan of Care --  agrees   Plan of Care Review   Progress --  no change   OTHER   Outcome Summary --  Pt has been pleasant this shift. He rates anxiety 7/10, depression 8/10, denies SI,HI, hallucinations and pain. Pt continues to be 1:1 with sitter at bedside. Pt has had CIWA scores of 1. Will continue to monitor this shift.       Problem: Overarching Goals (Adult)  Goal: Adheres to Safety Considerations for Self and Others  Outcome: Ongoing (interventions implemented as appropriate)    Goal: Optimized Coping Skills in Response to Life Stressors  Outcome: Ongoing (interventions implemented as appropriate)    Goal: Develops/Participates in Therapeutic [place] to Support Successful Transition  Outcome: Ongoing (interventions implemented as appropriate)      Problem: Mood Impairment (Depressive Signs/Symptoms) (Adult)  Goal: Improved Mood Symptoms (Depressive Signs/Symptoms)  Outcome: Ongoing (interventions implemented as appropriate)      Problem: Feelings of Worthlessness, Hopelessness, Excessive Guilt (Depressive Signs/Symptoms) (Adult)  Goal: Enhanced Self-Esteem/Confidence (Depressive Signs/Symptoms)  Outcome: Ongoing (interventions implemented as appropriate)      Problem: Social/Occupational/Functional Impairment (Depressive Signs/Symptoms) (Adult)  Goal: Improved Social/Occupational/Functional Skills (Depressive Signs/Symptoms)  Outcome: Ongoing (interventions implemented as appropriate)      Problem: Suicidal Behavior (Adult)  Goal: Suicidal Behavior is Absent/Minimized/Managed  Outcome: Ongoing (interventions implemented as appropriate)

## 2018-04-12 RX ADMIN — ACAMPROSATE CALCIUM ENTERIC-COATED 666 MG: 333 TABLET, DELAYED RELEASE ORAL at 16:59

## 2018-04-12 RX ADMIN — ACAMPROSATE CALCIUM ENTERIC-COATED 666 MG: 333 TABLET, DELAYED RELEASE ORAL at 21:27

## 2018-04-12 RX ADMIN — TRAZODONE HYDROCHLORIDE 50 MG: 50 TABLET ORAL at 21:27

## 2018-04-12 RX ADMIN — FOLIC ACID 1 MG: 1 TABLET ORAL at 08:40

## 2018-04-12 RX ADMIN — SERTRALINE 150 MG: 100 TABLET, FILM COATED ORAL at 08:40

## 2018-04-12 RX ADMIN — Medication 200 MG: at 08:40

## 2018-04-12 NOTE — PLAN OF CARE
Problem: Patient Care Overview  Goal: Plan of Care Review  Outcome: Ongoing (interventions implemented as appropriate)   04/12/18 0100 04/12/18 0358   Coping/Psychosocial   Plan of Care Reviewed With patient --    Coping/Psychosocial   Patient Agreement with Plan of Care agrees --    Plan of Care Review   Progress --  improving   OTHER   Outcome Summary --  Pt has been compliant with medication this shift. He reports anxiety 4/10, depression 5/10, denies pain,SI, HI, hallucinations and pain. Pt is not staging on CiWA. Will continue to monitor.       Problem: Overarching Goals (Adult)  Goal: Adheres to Safety Considerations for Self and Others  Outcome: Ongoing (interventions implemented as appropriate)    Goal: Optimized Coping Skills in Response to Life Stressors  Outcome: Ongoing (interventions implemented as appropriate)    Goal: Develops/Participates in Therapeutic [place] to Support Successful Transition  Outcome: Ongoing (interventions implemented as appropriate)      Problem: Mood Impairment (Depressive Signs/Symptoms) (Adult)  Goal: Improved Mood Symptoms (Depressive Signs/Symptoms)  Outcome: Ongoing (interventions implemented as appropriate)      Problem: Feelings of Worthlessness, Hopelessness, Excessive Guilt (Depressive Signs/Symptoms) (Adult)  Goal: Enhanced Self-Esteem/Confidence (Depressive Signs/Symptoms)  Outcome: Ongoing (interventions implemented as appropriate)      Problem: Social/Occupational/Functional Impairment (Depressive Signs/Symptoms) (Adult)  Goal: Improved Social/Occupational/Functional Skills (Depressive Signs/Symptoms)  Outcome: Ongoing (interventions implemented as appropriate)      Problem: Suicidal Behavior (Adult)  Goal: Suicidal Behavior is Absent/Minimized/Managed  Outcome: Ongoing (interventions implemented as appropriate)

## 2018-04-12 NOTE — PLAN OF CARE
Problem: Patient Care Overview  Goal: Plan of Care Review  Outcome: Ongoing (interventions implemented as appropriate)   04/12/18 0845 04/12/18 1551   Coping/Psychosocial   Plan of Care Reviewed With patient --    Coping/Psychosocial   Patient Agreement with Plan of Care agrees --    Plan of Care Review   Progress --  improving   OTHER   Outcome Summary --  Pt has been cooperative and med compliant this shift. Voiced depression 6/10, anxiety 0/10 and denies SI/HI and pain. Pt has attended all groups this shift and has been socializing with peers in day room. C/O not sleeping well d/t bad dreams. No other c/o at this time. Will continue to monitor and provide safe environment.                                             Problem: Overarching Goals (Adult)  Goal: Adheres to Safety Considerations for Self and Others  Outcome: Ongoing (interventions implemented as appropriate)   04/12/18 1551   Overarching Goals (Adult)   Adheres to Safety Considerations for Self and Others making progress toward outcome     Intervention: Develop and Maintain Individualized Safety Plan   04/12/18 0845 04/12/18 1400   C-SSRS (Recent)   Wish to be Dead no --    Suicidal Thoughts no --    Suicidal Thought with Method No Plan/Intent no --    Suicidal Intent (without Specific Plan) no --    Suicide Intent with Specific Plan no --    Suicide Behavior no --    Violence Risk   Feels Like Hurting Others no --    Previous Attempt to Harm Others no --    Develop and Maintain Individualized Safety Plan   Safety Measures --  safety rounds completed       Goal: Optimized Coping Skills in Response to Life Stressors  Outcome: Ongoing (interventions implemented as appropriate)   04/12/18 1551   Overarching Goals (Adult)   Optimized Coping Skills in Response to Life Stressors making progress toward outcome     Intervention: Promote Effective Coping Strategies   04/12/18 0845   Coping/Psychosocial Interventions   Supportive Measures active listening  utilized;verbalization of feelings encouraged       Goal: Develops/Participates in Therapeutic [place] to Support Successful Transition  Outcome: Ongoing (interventions implemented as appropriate)   04/12/18 1551   Overarching Goals (Adult)   Develops/Participates in Therapeutic [place] to Support Successful Transition making progress toward outcome     Intervention: Foster Therapeutic [place]   04/12/18 0845   Interventions   Trust Relationship/Rapport care explained;choices provided;reassurance provided;thoughts/feelings acknowledged;questions answered     Intervention: Mutually Develop Transition Plan   04/12/18 0845   Mutually Develop Transition Plan   Transition Support crisis management plan promoted         Problem: Mood Impairment (Depressive Signs/Symptoms) (Adult)  Goal: Improved Mood Symptoms (Depressive Signs/Symptoms)  Outcome: Ongoing (interventions implemented as appropriate)   04/11/18 1827 04/12/18 1551   Improved Mood Symptoms (Depressive Signs/Symptoms)   Improved Mood Symptoms Action Step/Short Term Goal (STG) Established 04/10/18 --    Improved Mood Symptoms Time Frame for Action Step (STG) --  2 days   Improved Mood Symptoms Action Step (STG) Outcome --  making progress toward outcome       Problem: Feelings of Worthlessness, Hopelessness, Excessive Guilt (Depressive Signs/Symptoms) (Adult)  Intervention: Promote Confidence and Self-Esteem   04/12/18 0845   Coping/Psychosocial Interventions   Supportive Measures active listening utilized;verbalization of feelings encouraged       Goal: Enhanced Self-Esteem/Confidence (Depressive Signs/Symptoms)  Outcome: Ongoing (interventions implemented as appropriate)   04/11/18 1827 04/12/18 1551   Enhanced Self-Esteem/Confidence (Depressive Signs/Symptoms)   Enhanced Self-Esteem/Confidence Action Step/Short Term Goal (STG) Established 04/10/18 --    Enhanced Self-Esteem/Confidence Time Frame for Action Step (STG) --  2 days   Enhanced  Self-Esteem/Confidence Action Step (STG) Outcome --  making progress toward outcome       Problem: Social/Occupational/Functional Impairment (Depressive Signs/Symptoms) (Adult)  Intervention: Promote Social, Occupational and Functional Ability   04/12/18 0845   Interventions   Trust Relationship/Rapport care explained;choices provided;reassurance provided;thoughts/feelings acknowledged;questions answered       Goal: Improved Social/Occupational/Functional Skills (Depressive Signs/Symptoms)  Outcome: Ongoing (interventions implemented as appropriate)   04/11/18 1827 04/12/18 1551   Improved Social/Occupational/Functional Skills (Depressive Signs/Symptoms)   Improved Social/Occupational/Functional Skills Action Step/Short Term Goal (STG) Established 04/10/18 --    Improved Social/Occupational/Functional Skills Time Frame for Action Step (STG) --  2 days   Improved Social/Occupational/Functional Skills Action Step (STG) Outcome --  making progress toward outcome       Problem: Suicidal Behavior (Adult)  Goal: Suicidal Behavior is Absent/Minimized/Managed  Outcome: Ongoing (interventions implemented as appropriate)   04/11/18 1827 04/12/18 1551   OTHER   Action Step/Short Term Goal (STG) Established 04/10/18 --    Suicidal Behavior is Absent/Minimized/Managed   Suicidal Behavior Managed/Minimized Time Frame for Action Step (STG) --  2 days   Suicidal Behavior Managed/Minimized Action Step (STG) Outcome --  making progress toward outcome

## 2018-04-12 NOTE — PROGRESS NOTES
The patient is tolerating initiation of Campral without complaint.  He exhibits little in the way or signs or symptoms of withdrawal and is been pleasant cooperative and active within the therapeutic milieu.  He reports that he said little contact with his wife since his admission to the hospital.  We continue to await a family therapy session.  He is denying suicidal ideaation during today's interview.

## 2018-04-13 RX ORDER — NALTREXONE HYDROCHLORIDE 50 MG/1
50 TABLET, FILM COATED ORAL NIGHTLY
Status: DISCONTINUED | OUTPATIENT
Start: 2018-04-13 | End: 2018-04-14 | Stop reason: HOSPADM

## 2018-04-13 RX ADMIN — ACAMPROSATE CALCIUM ENTERIC-COATED 666 MG: 333 TABLET, DELAYED RELEASE ORAL at 20:54

## 2018-04-13 RX ADMIN — NALTREXONE HYDROCHLORIDE 50 MG: 50 TABLET, FILM COATED ORAL at 20:54

## 2018-04-13 RX ADMIN — SERTRALINE 150 MG: 100 TABLET, FILM COATED ORAL at 09:23

## 2018-04-13 RX ADMIN — ACETAMINOPHEN 650 MG: 325 TABLET ORAL at 12:26

## 2018-04-13 RX ADMIN — ACAMPROSATE CALCIUM ENTERIC-COATED 666 MG: 333 TABLET, DELAYED RELEASE ORAL at 16:43

## 2018-04-13 RX ADMIN — ACAMPROSATE CALCIUM ENTERIC-COATED 666 MG: 333 TABLET, DELAYED RELEASE ORAL at 09:23

## 2018-04-13 RX ADMIN — TRAZODONE HYDROCHLORIDE 50 MG: 50 TABLET ORAL at 20:54

## 2018-04-13 NOTE — PLAN OF CARE
Problem: Patient Care Overview  Goal: Plan of Care Review  Outcome: Ongoing (interventions implemented as appropriate)   04/13/18 0925 04/13/18 1518   Coping/Psychosocial   Plan of Care Reviewed With patient --    Coping/Psychosocial   Patient Agreement with Plan of Care agrees --    Plan of Care Review   Progress --  improving   OTHER   Outcome Summary --  Pt pleasant this shift, interacting with peers. Voiced anxiety 5/10; depression 4/10 and denied SI/HI. Pt spoke with advisor about school voiced that he's ready to continue to work towards graduation. Pt stated his goalwas to be more active and get a job. C/O headache this afternoon, tylenol given at 1230, pt denied pain after reassessment. Will continue to monitor and provide safe environment.        Problem: Overarching Goals (Adult)  Goal: Adheres to Safety Considerations for Self and Others  Outcome: Ongoing (interventions implemented as appropriate)   04/13/18 1518   Overarching Goals (Adult)   Adheres to Safety Considerations for Self and Others making progress toward outcome     Intervention: Develop and Maintain Individualized Safety Plan   04/13/18 0925 04/13/18 1400   C-SSRS (Recent)   Wish to be Dead no --    Suicidal Thoughts no --    Suicidal Thought with Method No Plan/Intent no --    Suicidal Intent (without Specific Plan) no --    Suicide Intent with Specific Plan no --    Suicide Behavior no --    Violence Risk   Feels Like Hurting Others no --    Previous Attempt to Harm Others no --    Develop and Maintain Individualized Safety Plan   Safety Measures --  safety rounds completed       Goal: Optimized Coping Skills in Response to Life Stressors  Outcome: Ongoing (interventions implemented as appropriate)   04/13/18 1518   Overarching Goals (Adult)   Optimized Coping Skills in Response to Life Stressors making progress toward outcome     Intervention: Promote Effective Coping Strategies   04/13/18 0925   Coping/Psychosocial Interventions    Supportive Measures active listening utilized;verbalization of feelings encouraged;decision-making supported;goal setting facilitated       Goal: Develops/Participates in Therapeutic [place] to Support Successful Transition  Outcome: Ongoing (interventions implemented as appropriate)   04/13/18 1518   Overarching Goals (Adult)   Develops/Participates in Therapeutic [place] to Support Successful Transition making progress toward outcome     Intervention: Foster Therapeutic [place]   04/13/18 0925   Interventions   Trust Relationship/Rapport care explained;choices provided;reassurance provided;questions answered;questions encouraged;thoughts/feelings acknowledged     Intervention: Mutually Develop Transition Plan   04/13/18 0925   Mutually Develop Transition Plan   Transition Support crisis management plan promoted         Problem: Mood Impairment (Depressive Signs/Symptoms) (Adult)  Goal: Improved Mood Symptoms (Depressive Signs/Symptoms)  Outcome: Ongoing (interventions implemented as appropriate)   04/13/18 0448 04/13/18 1518   Improved Mood Symptoms (Depressive Signs/Symptoms)   Improved Mood Symptoms Action Step/Short Term Goal (STG) Established 04/10/18 --    Improved Mood Symptoms Time Frame for Action Step (STG) --  1 day   Improved Mood Symptoms Action Step (STG) Outcome --  making progress toward outcome       Problem: Feelings of Worthlessness, Hopelessness, Excessive Guilt (Depressive Signs/Symptoms) (Adult)  Intervention: Promote Confidence and Self-Esteem   04/13/18 0925   Coping/Psychosocial Interventions   Supportive Measures active listening utilized;verbalization of feelings encouraged;decision-making supported;goal setting facilitated       Goal: Enhanced Self-Esteem/Confidence (Depressive Signs/Symptoms)  Outcome: Ongoing (interventions implemented as appropriate)   04/13/18 0448 04/13/18 1518   Enhanced Self-Esteem/Confidence (Depressive Signs/Symptoms)   Enhanced Self-Esteem/Confidence  Action Step/Short Term Goal (STG) Established 04/10/18 --    Enhanced Self-Esteem/Confidence Time Frame for Action Step (STG) --  1 day   Enhanced Self-Esteem/Confidence Action Step (STG) Outcome --  making progress toward outcome       Problem: Social/Occupational/Functional Impairment (Depressive Signs/Symptoms) (Adult)  Intervention: Promote Social, Occupational and Functional Ability   04/13/18 0925   Interventions   Trust Relationship/Rapport care explained;choices provided;reassurance provided;questions answered;questions encouraged;thoughts/feelings acknowledged       Goal: Improved Social/Occupational/Functional Skills (Depressive Signs/Symptoms)  Outcome: Ongoing (interventions implemented as appropriate)   04/13/18 0448 04/13/18 1518   Improved Social/Occupational/Functional Skills (Depressive Signs/Symptoms)   Improved Social/Occupational/Functional Skills Action Step/Short Term Goal (STG) Established 04/10/18 --    Improved Social/Occupational/Functional Skills Time Frame for Action Step (STG) --  1 day   Improved Social/Occupational/Functional Skills Action Step (STG) Outcome --  making progress toward outcome

## 2018-04-13 NOTE — SIGNIFICANT NOTE
"Met with pt and his wife (via conference call) for a marital session.  Pt's wife shared that she filed for legal separation yesterday, and pt will be living with his parents.  Pt's wife stated she is not hopeful that he will remain sober in that environment because his mother is an active alcoholic and keeps alcohol in the house.  Pt voiced understanding of his wife's concerns, but states he does not have any other housing options at this time.  He plans to be out of the house for most of the day attending classes at [employer] and attend AA meetings daily.  He has established outpatient providers and states he would like to return to  [employer] after he graduates in two weeks.  Pt also discussed going to a group home house, but does not think they would accept him due to his class schedule.  Pt's wife expressed her anger towards patient and does not wish to continue their outpatient counseling at this time.  She asked pt to show her by his actions that this time will be different.  Pt expressed understanding and was respectful of his wife's feelings. After his wife hung up, pt and therapist processed session. Pt was tearful and expressed feeling overwhelmed by his wife's demands.  Therapist reminded pt he needs to pursue recovery for himself and encouraged utilizing his AA meetings and sponsor for support.  Pt is hopeful his new medication will be effective to curb his cravings.  Pt states he drinks several times a week and he will \"drink until everything is gone.\"  Therapist provided pt with list of local half way houses to call.  Pt voiced appreciation for session.   "

## 2018-04-13 NOTE — PLAN OF CARE
Problem: Patient Care Overview  Goal: Plan of Care Review  Outcome: Ongoing (interventions implemented as appropriate)   04/13/18 0448   Coping/Psychosocial   Plan of Care Reviewed With patient   Coping/Psychosocial   Patient Agreement with Plan of Care agrees   Plan of Care Review   Progress improving   OTHER   Outcome Summary Pt pleasant and cooperative this shift. Compliant with meds. Pt rated anxiety 6/10; depression 4/10. Pt stated that the reason his anxiety is high is because his wife told him today that she was filing for legal separation. Pt denied SI, HI, and hallucinations. Pt denied any withdrawal symptoms this shift. Pt social with peers. Pt appeared to rest well througout the night with no issues noted. Will continue to monitor.        Problem: Overarching Goals (Adult)  Goal: Adheres to Safety Considerations for Self and Others  Outcome: Ongoing (interventions implemented as appropriate)   04/13/18 0448   Overarching Goals (Adult)   Adheres to Safety Considerations for Self and Others making progress toward outcome     Goal: Optimized Coping Skills in Response to Life Stressors  Outcome: Ongoing (interventions implemented as appropriate)   04/13/18 0448   Overarching Goals (Adult)   Optimized Coping Skills in Response to Life Stressors making progress toward outcome     Goal: Develops/Participates in Therapeutic [place] to Support Successful Transition  Outcome: Ongoing (interventions implemented as appropriate)   04/13/18 0448   Overarching Goals (Adult)   Develops/Participates in Therapeutic [place] to Support Successful Transition making progress toward outcome       Problem: Mood Impairment (Depressive Signs/Symptoms) (Adult)  Goal: Improved Mood Symptoms (Depressive Signs/Symptoms)  Outcome: Ongoing (interventions implemented as appropriate)   04/13/18 0448   Improved Mood Symptoms (Depressive Signs/Symptoms)   Improved Mood Symptoms Action Step/Short Term Goal (STG) Established 04/10/18    Improved Mood Symptoms Time Frame for Action Step (STG) 1 day   Improved Mood Symptoms Action Step (STG) Outcome making progress toward outcome       Problem: Feelings of Worthlessness, Hopelessness, Excessive Guilt (Depressive Signs/Symptoms) (Adult)  Goal: Enhanced Self-Esteem/Confidence (Depressive Signs/Symptoms)  Outcome: Ongoing (interventions implemented as appropriate)   04/13/18 0448   Enhanced Self-Esteem/Confidence (Depressive Signs/Symptoms)   Enhanced Self-Esteem/Confidence Action Step/Short Term Goal (STG) Established 04/10/18   Enhanced Self-Esteem/Confidence Time Frame for Action Step (STG) 1 day   Enhanced Self-Esteem/Confidence Action Step (STG) Outcome making progress toward outcome       Problem: Social/Occupational/Functional Impairment (Depressive Signs/Symptoms) (Adult)  Goal: Improved Social/Occupational/Functional Skills (Depressive Signs/Symptoms)  Outcome: Ongoing (interventions implemented as appropriate)   04/13/18 0448   Improved Social/Occupational/Functional Skills (Depressive Signs/Symptoms)   Improved Social/Occupational/Functional Skills Action Step/Short Term Goal (STG) Established 04/10/18   Improved Social/Occupational/Functional Skills Time Frame for Action Step (STG) 1 day   Improved Social/Occupational/Functional Skills Action Step (STG) Outcome making progress toward outcome       Problem: Suicidal Behavior (Adult)  Goal: Suicidal Behavior is Absent/Minimized/Managed  Outcome: Ongoing (interventions implemented as appropriate)   04/13/18 0448   OTHER   Action Step/Short Term Goal (STG) Established 04/10/18   Suicidal Behavior is Absent/Minimized/Managed   Suicidal Behavior Managed/Minimized Time Frame for Action Step (STG) 1 day   Suicidal Behavior Managed/Minimized Action Step (STG) Outcome making progress toward outcome

## 2018-04-13 NOTE — PLAN OF CARE
Problem: Suicidal Behavior (Adult)  Goal: Suicidal Behavior is Absent/Minimized/Managed  Outcome: Outcome(s) achieved Date Met: 04/13/18 04/13/18 1516   Suicidal Behavior is Absent/Minimized/Managed   Suicidal Behavior Managed/Minimized Action Step (STG) Outcome achieves outcome

## 2018-04-13 NOTE — PROGRESS NOTES
The patient is pleasant and cooperative today.  He is tolerating initiation of Campral without complaint and exhibits no signs of alcohol withdrawal at this point.  He is reporting some improvement in mood and reduction in suicidal ideation, this in spite of the fact that his wife has informed him that she plans to file for a legal separation.  I will add ReVia to the patient's current medication regimen in hopes of addressing ongoing alcohol use issues.

## 2018-04-13 NOTE — PROGRESS NOTES
Continued Stay Note  [facility]     Patient Name: Tucker Henson  MRN: [id number]  Today's Date: 4/13/2018    Admit Date: 4/10/2018          Discharge Plan     Row Name 04/13/18 1416       Plan    Plan Comments SW spoke w/pt about discharge planning.  Pt stated that he will most likely have to live w/his parents once he is discharged.  Pt stated that he gets services through [facility].  Pt sees Dottie Urias, Therapist for individual counseling, Seda Hanley, Therapist for couples counseling, and SAVANA Kemp for medication management.  Pt stated that he will schedule appts.  SW discussed pt attending [facility]; pt stated that he would like to attend once he completes the semester for school.              Discharge Codes    No documentation.           ELZBIETA Lind

## 2018-04-14 VITALS
DIASTOLIC BLOOD PRESSURE: 85 MMHG | SYSTOLIC BLOOD PRESSURE: 135 MMHG | HEART RATE: 78 BPM | OXYGEN SATURATION: 95 % | BODY MASS INDEX: 28.12 KG/M2 | TEMPERATURE: 97.5 F | WEIGHT: 196 LBS | RESPIRATION RATE: 17 BRPM

## 2018-04-14 RX ORDER — ACAMPROSATE CALCIUM 333 MG/1
666 TABLET, DELAYED RELEASE ORAL 3 TIMES DAILY
Qty: 180 TABLET | Refills: 1 | Status: SHIPPED | OUTPATIENT
Start: 2018-04-14 | End: 2018-10-12

## 2018-04-14 RX ORDER — TRAZODONE HYDROCHLORIDE 50 MG/1
50 TABLET ORAL NIGHTLY
Qty: 30 TABLET | Refills: 1 | Status: SHIPPED | OUTPATIENT
Start: 2018-04-14 | End: 2018-09-08

## 2018-04-14 RX ORDER — NALTREXONE HYDROCHLORIDE 50 MG/1
50 TABLET, FILM COATED ORAL NIGHTLY
Qty: 30 TABLET | Refills: 1 | Status: SHIPPED | OUTPATIENT
Start: 2018-04-14 | End: 2018-10-12

## 2018-04-14 RX ADMIN — ACAMPROSATE CALCIUM ENTERIC-COATED 666 MG: 333 TABLET, DELAYED RELEASE ORAL at 15:23

## 2018-04-14 RX ADMIN — ACAMPROSATE CALCIUM ENTERIC-COATED 666 MG: 333 TABLET, DELAYED RELEASE ORAL at 08:09

## 2018-04-14 RX ADMIN — SERTRALINE 150 MG: 100 TABLET, FILM COATED ORAL at 08:10

## 2018-04-14 NOTE — NURSING NOTE
Patient discharged home with his mother.  He left with all belongings.  Discussed with him his medication, discharge instructions, and follow-up instructions.  He stated understanding.  He left in NAD.

## 2018-04-14 NOTE — DISCHARGE SUMMARY
DATES OF ADMISSION: 4/10/2018-4/14/2018    REASON FOR ADMISSION: The patient is a 37-year-old white male admitted with suicidal ideation related to recent relapse of alcohol use    LABS:  See chart    HOSPITAL COURSE:  The patient was admitted to the crisis management unit and placed on suicide precautions.  These were discontinued after the patient was able to promise safety in the hospital.  Patient was continued on Zoloft with dosage increased to 150 mg daily.  Additionally he was begun on Campral and eventually ReVia to address ongoing symptoms of alcohol craving.  Patient tolerated both these medications without complaint.  By 413 the patient had a positive family therapy session though he had learned of his wife's intention to seek a legal separation.  Discharge was ordered as per the patient's request on 4/14/2018    FINAL DIAGNOSIS:  Major depressive disorder recurrent moderate, alcohol use disorder    DISPOSITION ON DISCHARGE:  A full sting of the patient's medications is provided below.  The patient will follow through the auspices of community mental health resources and plans to attend the intensive outpatient programming once his studies are complete at the [employer].    PROGNOSIS: Tucker Hoffman   Home Medication Instructions CECILIA:[id number]    Printed on:04/14/18 2829   Medication Information                      acamprosate (CAMPRAL) 333 MG EC tablet  Take 2 tablets by mouth 3 (Three) Times a Day.             naltrexone (DEPADE) 50 MG tablet  Take 1 tablet by mouth Every Night.             sertraline (ZOLOFT) 50 MG tablet  Take 2 tablets by mouth Daily.             traZODone (DESYREL) 50 MG tablet  Take 1 tablet by mouth Every Night.

## 2018-04-14 NOTE — PLAN OF CARE
Problem: Patient Care Overview  Goal: Plan of Care Review  Outcome: Ongoing (interventions implemented as appropriate)   04/14/18 0424   Coping/Psychosocial   Plan of Care Reviewed With patient   Coping/Psychosocial   Patient Agreement with Plan of Care agrees   Plan of Care Review   Progress improving   OTHER   Outcome Summary Pt pleasant and cooperative this shift. Compliant with meds. Social with peers. Pt denied experiencing any withdrawal symptoms. Pt rated anxiety 5/10; depression 3/10. Pt denied any SI, HI, and hallucinations. Pt said the reason he is so anxious is because he was unable to get in touch with his academic counselor today and does not know what the decision is about pt returning to school. Provided pt education sheet on naltrexone. Pt appeared to rest well througout the night with no further issues noted. Will continue to monitor.        Problem: Overarching Goals (Adult)  Goal: Adheres to Safety Considerations for Self and Others  Outcome: Ongoing (interventions implemented as appropriate)   04/14/18 0424   Overarching Goals (Adult)   Adheres to Safety Considerations for Self and Others making progress toward outcome     Goal: Optimized Coping Skills in Response to Life Stressors  Outcome: Ongoing (interventions implemented as appropriate)   04/14/18 0424   Overarching Goals (Adult)   Optimized Coping Skills in Response to Life Stressors making progress toward outcome     Goal: Develops/Participates in Therapeutic [place] to Support Successful Transition  Outcome: Ongoing (interventions implemented as appropriate)   04/14/18 0424   Overarching Goals (Adult)   Develops/Participates in Therapeutic [place] to Support Successful Transition making progress toward outcome       Problem: Mood Impairment (Depressive Signs/Symptoms) (Adult)  Goal: Improved Mood Symptoms (Depressive Signs/Symptoms)  Outcome: Ongoing (interventions implemented as appropriate)   04/14/18 0424   Improved Mood Symptoms  (Depressive Signs/Symptoms)   Improved Mood Symptoms Action Step/Short Term Goal (STG) Established 04/14/18   Improved Mood Symptoms Action Step (STG) Outcome making progress toward outcome       Problem: Feelings of Worthlessness, Hopelessness, Excessive Guilt (Depressive Signs/Symptoms) (Adult)  Goal: Enhanced Self-Esteem/Confidence (Depressive Signs/Symptoms)  Outcome: Ongoing (interventions implemented as appropriate)   04/14/18 0424   Enhanced Self-Esteem/Confidence (Depressive Signs/Symptoms)   Enhanced Self-Esteem/Confidence Action Step/Short Term Goal (STG) Established 04/14/18   Enhanced Self-Esteem/Confidence Time Frame for Action Step (STG) 4 days   Enhanced Self-Esteem/Confidence Action Step (STG) Outcome making progress toward outcome       Problem: Social/Occupational/Functional Impairment (Depressive Signs/Symptoms) (Adult)  Goal: Improved Social/Occupational/Functional Skills (Depressive Signs/Symptoms)  Outcome: Ongoing (interventions implemented as appropriate)   04/14/18 0424   Improved Social/Occupational/Functional Skills (Depressive Signs/Symptoms)   Improved Social/Occupational/Functional Skills Action Step/Short Term Goal (STG) Established 04/14/18   Improved Social/Occupational/Functional Skills Time Frame for Action Step (STG) 4 days   Improved Social/Occupational/Functional Skills Action Step (STG) Outcome making progress toward outcome

## 2018-04-16 NOTE — PROGRESS NOTES
Continued Stay Note  [facility]     Patient Name: Tucker Henson  MRN: [id number]  Today's Date: 4/16/2018    Admit Date: 4/10/2018          Discharge Plan     Row Name 04/16/18 0811       Plan    Final Discharge Disposition Code 01 - home or self-care    Final Note On 4/14, pt was discharged per Dr. Alexandra's orders.  SW met w/pt on Friday, 4/13 to discuss follow-up care.  Pt receives svcs from [facility]; pt sees Dottie Urias for individual therapy, Seda Hanley for couples therapy, and SAVANA Kemp for medications.  Pt stated that he also goes to  and  has a sponsor.  Pt stated that he is interested in [facility] IOP after he graduates from college in 2 weeks.  Pt stated that he would most likely have to live w/parents due to wife wanting to separate.                Discharge Codes    No documentation.       Expected Discharge Date and Time     Expected Discharge Date Expected Discharge Time    Apr 14, 2018             LEZBIETA Lind

## 2018-04-23 ENCOUNTER — TELEPHONE (OUTPATIENT)
Dept: PSYCHIATRY | Facility: HOSPITAL | Age: 38
End: 2018-04-23

## 2018-04-23 NOTE — TELEPHONE ENCOUNTER
"Patient reports he is doing \"alright,\" but admits he still struggles with his depression.  He states he has all his prescriptions and understands to follow up with his current providers.  He was complimentary of staff.  He requested a work release note and was transferred to Cortney Garcia's voicemail.  No further assistance requested at this time.  "

## 2018-09-08 ENCOUNTER — HOSPITAL ENCOUNTER (EMERGENCY)
Facility: HOSPITAL | Age: 38
Discharge: HOME | End: 2018-09-08
Attending: EMERGENCY MEDICINE | Admitting: EMERGENCY MEDICINE

## 2018-09-08 ENCOUNTER — APPOINTMENT (OUTPATIENT)
Dept: GENERAL RADIOLOGY | Facility: HOSPITAL | Age: 38
End: 2018-09-08

## 2018-09-08 ENCOUNTER — APPOINTMENT (OUTPATIENT)
Dept: CT IMAGING | Facility: HOSPITAL | Age: 38
End: 2018-09-08

## 2018-09-08 VITALS
HEIGHT: 71 IN | HEART RATE: 80 BPM | SYSTOLIC BLOOD PRESSURE: 151 MMHG | DIASTOLIC BLOOD PRESSURE: 101 MMHG | OXYGEN SATURATION: 95 % | RESPIRATION RATE: 16 BRPM | WEIGHT: 185 LBS | TEMPERATURE: 99.3 F | BODY MASS INDEX: 25.9 KG/M2

## 2018-09-08 DIAGNOSIS — R79.89 ELEVATED LFTS: ICD-10-CM

## 2018-09-08 DIAGNOSIS — R93.5 ABNORMAL CT OF THE ABDOMEN: ICD-10-CM

## 2018-09-08 DIAGNOSIS — R11.2 NAUSEA AND VOMITING, INTRACTABILITY OF VOMITING NOT SPECIFIED, UNSPECIFIED VOMITING TYPE: ICD-10-CM

## 2018-09-08 DIAGNOSIS — R50.9 FEVER, UNSPECIFIED FEVER CAUSE: Primary | ICD-10-CM

## 2018-09-08 LAB
ALBUMIN SERPL-MCNC: 4.9 G/DL (ref 3.5–5.2)
ALBUMIN/GLOB SERPL: 1.5 G/DL
ALP SERPL-CCNC: 87 U/L (ref 39–117)
ALT SERPL W P-5'-P-CCNC: 140 U/L (ref 1–41)
ANION GAP SERPL CALCULATED.3IONS-SCNC: 16.4 MMOL/L
AST SERPL-CCNC: 78 U/L (ref 1–40)
BACTERIA UR QL AUTO: NORMAL /HPF
BASOPHILS # BLD AUTO: 0.02 10*3/MM3 (ref 0–0.2)
BASOPHILS NFR BLD AUTO: 0.4 % (ref 0–1.5)
BILIRUB SERPL-MCNC: 0.3 MG/DL (ref 0.1–1.2)
BILIRUB UR QL STRIP: NEGATIVE
BUN BLD-MCNC: 9 MG/DL (ref 6–20)
BUN/CREAT SERPL: 9 (ref 7–25)
CALCIUM SPEC-SCNC: 11.1 MG/DL (ref 8.6–10.5)
CHLORIDE SERPL-SCNC: 93 MMOL/L (ref 98–107)
CLARITY UR: CLEAR
CO2 SERPL-SCNC: 25.6 MMOL/L (ref 22–29)
COLOR UR: YELLOW
CREAT BLD-MCNC: 1 MG/DL (ref 0.76–1.27)
D-LACTATE SERPL-SCNC: 1.2 MMOL/L (ref 0.5–2)
DEPRECATED RDW RBC AUTO: 42.2 FL (ref 37–54)
EOSINOPHIL # BLD AUTO: 0 10*3/MM3 (ref 0–0.7)
EOSINOPHIL NFR BLD AUTO: 0 % (ref 0.3–6.2)
ERYTHROCYTE [DISTWIDTH] IN BLOOD BY AUTOMATED COUNT: 13 % (ref 11.5–14.5)
ETHANOL BLD-MCNC: <10 MG/DL (ref 0–10)
ETHANOL UR QL: <0.01 %
GFR SERPL CREATININE-BSD FRML MDRD: 84 ML/MIN/1.73
GLOBULIN UR ELPH-MCNC: 3.2 GM/DL
GLUCOSE BLD-MCNC: 112 MG/DL (ref 65–99)
GLUCOSE UR STRIP-MCNC: NEGATIVE MG/DL
HCT VFR BLD AUTO: 44.6 % (ref 40.4–52.2)
HGB BLD-MCNC: 15.8 G/DL (ref 13.7–17.6)
HGB UR QL STRIP.AUTO: NEGATIVE
HOLD SPECIMEN: NORMAL
HOLD SPECIMEN: NORMAL
HYALINE CASTS UR QL AUTO: NORMAL /LPF
IMM GRANULOCYTES # BLD: 0.02 10*3/MM3 (ref 0–0.03)
IMM GRANULOCYTES NFR BLD: 0.4 % (ref 0–0.5)
KETONES UR QL STRIP: ABNORMAL
LEUKOCYTE ESTERASE UR QL STRIP.AUTO: NEGATIVE
LIPASE SERPL-CCNC: 44 U/L (ref 13–60)
LYMPHOCYTES # BLD AUTO: 0.46 10*3/MM3 (ref 0.9–4.8)
LYMPHOCYTES NFR BLD AUTO: 8.3 % (ref 19.6–45.3)
MCH RBC QN AUTO: 31.5 PG (ref 27–32.7)
MCHC RBC AUTO-ENTMCNC: 35.4 G/DL (ref 32.6–36.4)
MCV RBC AUTO: 88.8 FL (ref 79.8–96.2)
MONOCYTES # BLD AUTO: 0.46 10*3/MM3 (ref 0.2–1.2)
MONOCYTES NFR BLD AUTO: 8.3 % (ref 5–12)
NEUTROPHILS # BLD AUTO: 4.58 10*3/MM3 (ref 1.9–8.1)
NEUTROPHILS NFR BLD AUTO: 83 % (ref 42.7–76)
NITRITE UR QL STRIP: NEGATIVE
PH UR STRIP.AUTO: 6 [PH] (ref 5–8)
PLATELET # BLD AUTO: 121 10*3/MM3 (ref 140–500)
PMV BLD AUTO: 10.1 FL (ref 6–12)
POTASSIUM BLD-SCNC: 3.8 MMOL/L (ref 3.5–5.2)
PROCALCITONIN SERPL-MCNC: 0.16 NG/ML (ref 0.1–0.25)
PROT SERPL-MCNC: 8.1 G/DL (ref 6–8.5)
PROT UR QL STRIP: ABNORMAL
RBC # BLD AUTO: 5.02 10*6/MM3 (ref 4.6–6)
RBC # UR: NORMAL /HPF
REF LAB TEST METHOD: NORMAL
S PYO AG THROAT QL: NEGATIVE
SODIUM BLD-SCNC: 135 MMOL/L (ref 136–145)
SP GR UR STRIP: 1.02 (ref 1–1.03)
SQUAMOUS #/AREA URNS HPF: NORMAL /HPF
UROBILINOGEN UR QL STRIP: ABNORMAL
WBC NRBC COR # BLD: 5.52 10*3/MM3 (ref 4.5–10.7)
WBC UR QL AUTO: NORMAL /HPF
WHOLE BLOOD HOLD SPECIMEN: NORMAL
WHOLE BLOOD HOLD SPECIMEN: NORMAL

## 2018-09-08 PROCEDURE — 96374 THER/PROPH/DIAG INJ IV PUSH: CPT

## 2018-09-08 PROCEDURE — 84145 PROCALCITONIN (PCT): CPT | Performed by: NURSE PRACTITIONER

## 2018-09-08 PROCEDURE — 94770: CPT

## 2018-09-08 PROCEDURE — 80307 DRUG TEST PRSMV CHEM ANLYZR: CPT | Performed by: EMERGENCY MEDICINE

## 2018-09-08 PROCEDURE — 36415 COLL VENOUS BLD VENIPUNCTURE: CPT

## 2018-09-08 PROCEDURE — 87880 STREP A ASSAY W/OPTIC: CPT | Performed by: NURSE PRACTITIONER

## 2018-09-08 PROCEDURE — 71046 X-RAY EXAM CHEST 2 VIEWS: CPT

## 2018-09-08 PROCEDURE — 80053 COMPREHEN METABOLIC PANEL: CPT | Performed by: NURSE PRACTITIONER

## 2018-09-08 PROCEDURE — 74177 CT ABD & PELVIS W/CONTRAST: CPT

## 2018-09-08 PROCEDURE — 83605 ASSAY OF LACTIC ACID: CPT | Performed by: NURSE PRACTITIONER

## 2018-09-08 PROCEDURE — 87081 CULTURE SCREEN ONLY: CPT | Performed by: NURSE PRACTITIONER

## 2018-09-08 PROCEDURE — 25010000002 IOPAMIDOL 61 % SOLUTION: Performed by: EMERGENCY MEDICINE

## 2018-09-08 PROCEDURE — 85025 COMPLETE CBC W/AUTO DIFF WBC: CPT | Performed by: NURSE PRACTITIONER

## 2018-09-08 PROCEDURE — 81001 URINALYSIS AUTO W/SCOPE: CPT | Performed by: NURSE PRACTITIONER

## 2018-09-08 PROCEDURE — 83690 ASSAY OF LIPASE: CPT | Performed by: NURSE PRACTITIONER

## 2018-09-08 PROCEDURE — 99284 EMERGENCY DEPT VISIT MOD MDM: CPT

## 2018-09-08 PROCEDURE — 96361 HYDRATE IV INFUSION ADD-ON: CPT

## 2018-09-08 RX ORDER — ALUMINA, MAGNESIA, AND SIMETHICONE 2400; 2400; 240 MG/30ML; MG/30ML; MG/30ML
15 SUSPENSION ORAL ONCE
Status: COMPLETED | OUTPATIENT
Start: 2018-09-08 | End: 2018-09-08

## 2018-09-08 RX ORDER — SODIUM CHLORIDE 0.9 % (FLUSH) 0.9 %
10 SYRINGE (ML) INJECTION AS NEEDED
Status: DISCONTINUED | OUTPATIENT
Start: 2018-09-08 | End: 2018-09-08 | Stop reason: HOSPADM

## 2018-09-08 RX ORDER — PANTOPRAZOLE SODIUM 40 MG/1
40 TABLET, DELAYED RELEASE ORAL DAILY
Qty: 14 TABLET | Refills: 0 | Status: SHIPPED | OUTPATIENT
Start: 2018-09-08 | End: 2018-09-22

## 2018-09-08 RX ORDER — FAMOTIDINE 10 MG/ML
20 INJECTION, SOLUTION INTRAVENOUS ONCE
Status: COMPLETED | OUTPATIENT
Start: 2018-09-08 | End: 2018-09-08

## 2018-09-08 RX ORDER — ACETAMINOPHEN 500 MG
1000 TABLET ORAL ONCE
Status: COMPLETED | OUTPATIENT
Start: 2018-09-08 | End: 2018-09-08

## 2018-09-08 RX ORDER — SUCRALFATE ORAL 1 G/10ML
1 SUSPENSION ORAL 4 TIMES DAILY
Qty: 160 ML | Refills: 0 | Status: SHIPPED | OUTPATIENT
Start: 2018-09-08 | End: 2018-09-12

## 2018-09-08 RX ORDER — ONDANSETRON 4 MG/1
4 TABLET, ORALLY DISINTEGRATING ORAL EVERY 6 HOURS PRN
Qty: 12 TABLET | Refills: 0 | Status: SHIPPED | OUTPATIENT
Start: 2018-09-08 | End: 2018-09-27

## 2018-09-08 RX ADMIN — LIDOCAINE HYDROCHLORIDE 15 ML: 20 SOLUTION ORAL; TOPICAL at 19:01

## 2018-09-08 RX ADMIN — FAMOTIDINE 20 MG: 10 INJECTION, SOLUTION INTRAVENOUS at 19:00

## 2018-09-08 RX ADMIN — SODIUM CHLORIDE 2517 ML: 9 INJECTION, SOLUTION INTRAVENOUS at 17:30

## 2018-09-08 RX ADMIN — ALUMINUM HYDROXIDE, MAGNESIUM HYDROXIDE, AND DIMETHICONE 15 ML: 400; 400; 40 SUSPENSION ORAL at 19:00

## 2018-09-08 RX ADMIN — IOPAMIDOL 85 ML: 612 INJECTION, SOLUTION INTRAVENOUS at 17:44

## 2018-09-08 RX ADMIN — ACETAMINOPHEN 1000 MG: 500 TABLET, FILM COATED ORAL at 16:23

## 2018-09-08 NOTE — ED PROVIDER NOTES
" EMERGENCY DEPARTMENT ENCOUNTER    CHIEF COMPLAINT  Chief Complaint: Abdominal Pain  History given by: Patient   History limited by: none  Room Number: 39/39  PMD: Junior Davalos MD      HPI:  Pt is a 38 y.o. male who presents complaining of epigastric abd pain that worsens upon eating for the past 2 weeks, and has been constant for the past 4 days. Pt confirms chills, N/V/D, and \"spiking\" episodes of chest pain that last seconds at a time. Pt confirms multiple episodes of emesis today and 4 episodes of diarrhea yesterday, but denies blood in either. Pt states pain improves with sitting upright. Pt confirms hx of EtOH abuse ,but states he has not had any alcohol to drink in several days. Per pt, he has hx of appendectomy, denies hx of cholecystectomy.     Duration:  2 weeks   Onset: gradual  Timing: intermittent, now constant  Location: epigastric abd   Radiation: none  Quality: pain   Intensity/Severity: mild   Progression: worsening   Associated Symptoms: chills, N/V/D, and \"spiking\" chest pain   Aggravating Factors: eating   Alleviating Factors: sitting upright   Previous Episodes: none  Treatment before arrival: none    PAST MEDICAL HISTORY  Active Ambulatory Problems     Diagnosis Date Noted   • Mood disorder (CMS/HCC)    • Depression    • Generalized headaches    • Chest pain    • Palpitations    • Depression with suicidal ideation 04/10/2018   • Suicidal ideations 04/10/2018     Resolved Ambulatory Problems     Diagnosis Date Noted   • No Resolved Ambulatory Problems     Past Medical History:   Diagnosis Date   • Alcohol abuse    • Alcoholism (CMS/HCC)    • Chest pain    • Depression    • Generalized headaches    • Mood disorder (CMS/HCC)    • Palpitations    • PV (pityriasis versicolor)        PAST SURGICAL HISTORY  Past Surgical History:   Procedure Laterality Date   • APPENDECTOMY N/A 11/09/2009    Dr. Paulina Titus       FAMILY HISTORY  Family History   Problem Relation Age of Onset   • Hypertension " "Mother    • Alcohol abuse Mother    • Alcohol abuse Father    • Bipolar disorder Father    • ALS Paternal Grandfather    • Alcohol abuse Paternal Grandfather    • Depression Paternal Grandfather    • Alcohol abuse Brother    • Suicide Attempts Other        SOCIAL HISTORY  Social History     Social History   • Marital status:      Spouse name: N/A   • Number of children: N/A   • Years of education: N/A     Occupational History   • Not on file.     Social History Main Topics   • Smoking status: Former Smoker     Types: Cigarettes   • Smokeless tobacco: Never Used      Comment: Smokes a few cigarettes a day   • Alcohol use No      Comment: pt reports being a couple months sober. was vodka daily drinker.   • Drug use: Unknown   • Sexual activity: Defer     Other Topics Concern   • Not on file     Social History Narrative   • No narrative on file       ALLERGIES  Patient has no known allergies.    REVIEW OF SYSTEMS  Review of Systems   Constitutional: Positive for chills. Negative for fever.   HENT: Negative for sore throat and trouble swallowing.    Eyes: Negative for visual disturbance.   Respiratory: Negative for cough and shortness of breath.    Cardiovascular: Negative for chest pain (\"spiking\") and leg swelling.   Gastrointestinal: Positive for abdominal pain (epigastric), diarrhea, nausea and vomiting. Negative for anal bleeding and blood in stool.   Endocrine: Negative.    Genitourinary: Negative for decreased urine volume and frequency.   Musculoskeletal: Negative for neck pain.   Skin: Negative for rash.   Allergic/Immunologic: Negative.    Neurological: Negative for weakness and numbness.   Hematological: Negative.    Psychiatric/Behavioral: Negative.    All other systems reviewed and are negative.      PHYSICAL EXAM  ED Triage Vitals   Temp Heart Rate Resp BP SpO2   09/08/18 1556 09/08/18 1556 09/08/18 1556 09/08/18 1605 09/08/18 1556   (!) 101.1 °F (38.4 °C) (!) 128 18 (!) 140/112 99 %      Temp src " Heart Rate Source Patient Position BP Location FiO2 (%)   09/08/18 1556 09/08/18 1556 -- -- --   Tympanic Monitor          Physical Exam   Constitutional: He is oriented to person, place, and time. He appears distressed (mild).   HENT:   Head: Normocephalic and atraumatic.   Eyes: EOM are normal.   Neck: Normal range of motion.   Cardiovascular: Normal rate, regular rhythm and normal heart sounds.    No murmur heard.  Pulses:       Posterior tibial pulses are 2+ on the right side, and 2+ on the left side.   Pulmonary/Chest: Effort normal and breath sounds normal. No respiratory distress. He has no wheezes.   Abdominal: Soft. Bowel sounds are normal. There is tenderness (mild) in the epigastric area. There is no rebound, no guarding and negative Jordan's sign.   Musculoskeletal: Normal range of motion. He exhibits no edema.   Neurological: He is alert and oriented to person, place, and time.   Skin: Skin is warm and dry.   Psychiatric: Affect normal.   Nursing note and vitals reviewed.      LAB RESULTS  Lab Results (last 24 hours)     Procedure Component Value Units Date/Time    CBC & Differential [[id number]] Collected:  09/08/18 1612    Specimen:  Blood Updated:  09/08/18 1623    Narrative:       The following orders were created for panel order CBC & Differential.  Procedure                               Abnormality         Status                     ---------                               -----------         ------                     CBC Auto Differential[[id number]]        Abnormal            Final result                 Please view results for these tests on the individual orders.    Comprehensive Metabolic Panel [[id number]]  (Abnormal) Collected:  09/08/18 1612    Specimen:  Blood Updated:  09/08/18 1657     Glucose 112 (H) mg/dL      BUN 9 mg/dL      Creatinine 1.00 mg/dL      Sodium 135 (L) mmol/L      Potassium 3.8 mmol/L      Chloride 93 (L) mmol/L      CO2 25.6 mmol/L      Calcium 11.1 (H) mg/dL      Total  "Protein 8.1 g/dL      Albumin 4.90 g/dL      ALT (SGPT) 140 (H) U/L      AST (SGOT) 78 (H) U/L      Alkaline Phosphatase 87 U/L      Total Bilirubin 0.3 mg/dL      eGFR Non African Amer 84 mL/min/1.73      Globulin 3.2 gm/dL      A/G Ratio 1.5 g/dL      BUN/Creatinine Ratio 9.0     Anion Gap 16.4 mmol/L     Lipase [[id number]]  (Normal) Collected:  09/08/18 1612    Specimen:  Blood Updated:  09/08/18 1645     Lipase 44 U/L     Lactic Acid, Plasma [[id number]]  (Normal) Collected:  09/08/18 1612    Specimen:  Blood Updated:  09/08/18 1635     Lactate 1.2 mmol/L     Procalcitonin [[id number]]  (Normal) Collected:  09/08/18 1612    Specimen:  Blood Updated:  09/08/18 1652     Procalcitonin 0.16 ng/mL     Narrative:       As a Marker for Sepsis (Non-Neonates):   1. <0.5 ng/mL represents a low risk of severe sepsis and/or septic shock.  1. >2 ng/mL represents a high risk of severe sepsis and/or septic shock.    As a Marker for Lower Respiratory Tract Infections that require antibiotic therapy:  PCT on Admission     Antibiotic Therapy             6-12 Hrs later  > 0.5                Strongly Recommended            >0.25 - <0.5         Recommended  0.1 - 0.25           Discouraged                   Remeasure/reassess PCT  <0.1                 Strongly Discouraged          Remeasure/reassess PCT      As 28 day mortality risk marker: \"Change in Procalcitonin Result\" (> 80 % or <=80 %) if Day 0 (or Day 1) and Day 4 values are available. Refer to http://www.[employer]-pct-calculator.com/   Change in PCT <=80 %   A decrease of PCT levels below or equal to 80 % defines a positive change in PCT test result representing a higher risk for 28-day all-cause mortality of patients diagnosed with severe sepsis or septic shock.  Change in PCT > 80 %   A decrease of PCT levels of more than 80 % defines a negative change in PCT result representing a lower risk for 28-day all-cause mortality of patients diagnosed with severe sepsis or septic " shock.                CBC Auto Differential [[id number]]  (Abnormal) Collected:  09/08/18 1612    Specimen:  Blood Updated:  09/08/18 1623     WBC 5.52 10*3/mm3      RBC 5.02 10*6/mm3      Hemoglobin 15.8 g/dL      Hematocrit 44.6 %      MCV 88.8 fL      MCH 31.5 pg      MCHC 35.4 g/dL      RDW 13.0 %      RDW-SD 42.2 fl      MPV 10.1 fL      Platelets 121 (L) 10*3/mm3      Neutrophil % 83.0 (H) %      Lymphocyte % 8.3 (L) %      Monocyte % 8.3 %      Eosinophil % 0.0 (L) %      Basophil % 0.4 %      Immature Grans % 0.4 %      Neutrophils, Absolute 4.58 10*3/mm3      Lymphocytes, Absolute 0.46 (L) 10*3/mm3      Monocytes, Absolute 0.46 10*3/mm3      Eosinophils, Absolute 0.00 10*3/mm3      Basophils, Absolute 0.02 10*3/mm3      Immature Grans, Absolute 0.02 10*3/mm3     Ethanol [[id number]] Collected:  09/08/18 1612    Specimen:  Blood Updated:  09/08/18 1721     Ethanol <10 mg/dL      Ethanol % <0.010 %     Rapid Strep A Screen - Swab, Throat [[id number]]  (Normal) Collected:  09/08/18 1623    Specimen:  Swab from Throat Updated:  09/08/18 1641     Strep A Ag Negative    Beta Strep Culture, Throat - Swab, Throat [[id number]] Collected:  09/08/18 1623    Specimen:  Swab from Throat Updated:  09/08/18 1641    Urinalysis With Microscopic If Indicated (No Culture) - Urine, Clean Catch [[id number]]  (Abnormal) Collected:  09/08/18 1636    Specimen:  Urine from Urine, Clean Catch Updated:  09/08/18 1702     Color, UA Yellow     Appearance, UA Clear     pH, UA 6.0     Specific Gravity, UA 1.025     Glucose, UA Negative     Ketones, UA 40 mg/dL (2+) (A)     Bilirubin, UA Negative     Blood, UA Negative     Protein, UA 30 mg/dL (1+) (A)     Leuk Esterase, UA Negative     Nitrite, UA Negative     Urobilinogen, UA 0.2 E.U./dL    Urinalysis, Microscopic Only - Urine, Clean Catch [[id number]] Collected:  09/08/18 1636    Specimen:  Urine from Urine, Clean Catch Updated:  09/08/18 1702     RBC, UA 0-2 /HPF      WBC, UA 0-2 /HPF       Bacteria, UA None Seen /HPF      Squamous Epithelial Cells, UA 0-2 /HPF      Hyaline Casts, UA 3-6 /LPF      Methodology Automated Microscopy          I ordered the above labs and reviewed the results    RADIOLOGY  XR Chest 2 View    (Results Pending)   CT Abdomen Pelvis With Contrast    (Results Pending)        I ordered the above noted radiological studies. Interpreted by radiologist. Discussed with radiologist (Roman). Reviewed by me in PACS.     Procedures      PROGRESS AND CONSULTS  ED Course as of Sep 08 2025   Sat Sep 08, 2018   1605 Non bloody diarrhea, sore throat upper abdominal pain for 2-3 days. Fever 101 in ED ,did not check at home.   [JS]      ED Course User Index  [JS] Alena Hayes Srini, APRN     1648: CT Abd/Pelvis and CXR ordered for further evaluation.     1845: RN approached and states pt is requesting pain medication. GI cocktail and Pepcid ordered for pain management.     1959: Pt rechecked and resting comfortably, states he feels much better,  fever and emesis improved, able to tolerate PO. Discussed results of labs and CT with plan for discharge with protonix, antiemetics, and carafate for outpatient PCP follow up. Pt directed to follow up for recheck of lymph nodes seen on CT abdomen due to concern for abnormal growth of cell such as cancer that could be lifethreatening or disabling and LFT levels. Pt informed of plan to maintain fluids and bland diet, as well as decreased EtOH intake. Pt understands and agrees with plan, all questions addressed.       MEDICAL DECISION MAKING  Results were reviewed/discussed with the patient and they were also made aware of online access. Pt also made aware that some labs, such as cultures, will not be resulted during ER visit and follow up with PMD is necessary.     MDM  Number of Diagnoses or Management Options  Abnormal CT of the abdomen:   Elevated LFTs:   Fever, unspecified fever cause:   Nausea and vomiting, intractability of vomiting not  specified, unspecified vomiting type:      Amount and/or Complexity of Data Reviewed  Clinical lab tests: reviewed and ordered (ALT - 140  AST - 78)  Tests in the radiology section of CPT®: reviewed (CT- slightly enlarged lymph nodes just distal to GE junction, recommended 3 month follow up. Pt has no signs of perforation, but has mild hepatomegaly  CXR - negative chest )           DIAGNOSIS  Final diagnoses:   Fever, unspecified fever cause   Abnormal CT of the abdomen   Elevated LFTs   Nausea and vomiting, intractability of vomiting not specified, unspecified vomiting type       DISPOSITION  DISCHARGE    Patient discharged in stable condition.    Reviewed implications of results, diagnosis, meds, responsibility to follow up, warning signs and symptoms of possible worsening, potential complications and reasons to return to ER.    Patient/Family voiced understanding of above instructions.    Discussed plan for discharge, as there is no emergent indication for admission. Patient referred to primary care provider for BP management due to today's BP. Pt/family is agreeable and understands need for follow up and repeat testing.  Pt is aware that discharge does not mean that nothing is wrong but it indicates no emergency is present that requires admission and they must continue care with follow-up as given below or physician of their choice.     FOLLOW-UP  Junior Davalos MD  [address] [address]  [phone]    Schedule an appointment as soon as possible for a visit in 2 days  EVEN IF WELL         Medication List      New Prescriptions    ondansetron ODT 4 MG disintegrating tablet  Commonly known as:  ZOFRAN ODT  Take 1 tablet by mouth Every 6 (Six) Hours As Needed for Vomiting.     pantoprazole 40 MG EC tablet  Commonly known as:  PROTONIX  Take 1 tablet by mouth Daily for 14 days.     sucralfate 1 GM/10ML suspension  Commonly known as:  CARAFATE  Take 10 mL by mouth 4 (Four) Times a Day for  4 days.        Stop    traZODone 50 MG tablet  Commonly known as:  DESYREL              Latest Documented Vital Signs:  As of 8:25 PM  BP- (!) 151/101 HR- 80 Temp- 99.3 °F (37.4 °C) (Oral) O2 sat- 95%    --  Documentation assistance provided by madelaine Ortega for Dr. Terrazas.  Information recorded by the scribe was done at my direction and has been verified and validated by me.              Seda Ortega  09/08/18 2025       Gabbi Terrazas MD  09/09/18 0107

## 2018-09-09 NOTE — DISCHARGE INSTRUCTIONS
You are advised to follow closely with Dr Davalos in 2-3 days for recheck, follow up of enlarged lymph nodes seen on your CT abdomen today,elevated liver function tests, hypertension,  final results of lab work and imaging testing, and further testing/treatment as needed.    Drink plenty of fluids,\  Avoid alcohol  Chambers diet  Advance diet as tolerated    Please return to the emergency department immediately with chest pain different than usual for you, shortness of air, abdominal pain, persistent vomiting/fever, blood in emesis or stool, lightheadedness/fainting, problems with speech, one sided weakness/numbness, new incontinence, problems with vision, altered mental status or for worsening of symptoms or other concerns.

## 2018-09-10 LAB — BACTERIA SPEC AEROBE CULT: NORMAL

## 2018-09-27 ENCOUNTER — OFFICE VISIT (OUTPATIENT)
Dept: INTERNAL MEDICINE | Facility: CLINIC | Age: 38
End: 2018-09-27

## 2018-09-27 VITALS
BODY MASS INDEX: 24.92 KG/M2 | SYSTOLIC BLOOD PRESSURE: 122 MMHG | HEIGHT: 71 IN | WEIGHT: 178 LBS | HEART RATE: 97 BPM | OXYGEN SATURATION: 98 % | DIASTOLIC BLOOD PRESSURE: 80 MMHG

## 2018-09-27 DIAGNOSIS — R59.9 ENLARGED LYMPH NODES: ICD-10-CM

## 2018-09-27 DIAGNOSIS — R79.89 ELEVATED LFTS: ICD-10-CM

## 2018-09-27 DIAGNOSIS — R10.13 EPIGASTRIC PAIN: Primary | ICD-10-CM

## 2018-09-27 DIAGNOSIS — F10.11 HISTORY OF ETOH ABUSE: ICD-10-CM

## 2018-09-27 LAB
ALBUMIN SERPL-MCNC: 4.8 G/DL (ref 3.5–5.2)
ALBUMIN/GLOB SERPL: 1.5 G/DL
ALP SERPL-CCNC: 80 U/L (ref 39–117)
ALT SERPL W P-5'-P-CCNC: 62 U/L (ref 1–41)
ANION GAP SERPL CALCULATED.3IONS-SCNC: 14.6 MMOL/L
AST SERPL-CCNC: 25 U/L (ref 1–40)
BASOPHILS # BLD AUTO: 0.02 10*3/MM3 (ref 0–0.2)
BASOPHILS NFR BLD AUTO: 0.4 % (ref 0–2)
BILIRUB SERPL-MCNC: 0.4 MG/DL (ref 0.1–1.2)
BUN BLD-MCNC: 10 MG/DL (ref 6–20)
BUN/CREAT SERPL: 10.5 (ref 7–25)
CALCIUM SPEC-SCNC: 9.9 MG/DL (ref 8.6–10.5)
CHLORIDE SERPL-SCNC: 106 MMOL/L (ref 98–107)
CO2 SERPL-SCNC: 25.4 MMOL/L (ref 22–29)
CREAT BLD-MCNC: 0.95 MG/DL (ref 0.76–1.27)
DEPRECATED RDW RBC AUTO: 40.7 FL (ref 37–54)
EOSINOPHIL # BLD AUTO: 0.08 10*3/MM3 (ref 0–0.7)
EOSINOPHIL NFR BLD AUTO: 1.8 % (ref 0–5)
ERYTHROCYTE [DISTWIDTH] IN BLOOD BY AUTOMATED COUNT: 12.5 % (ref 11.5–15)
GFR SERPL CREATININE-BSD FRML MDRD: 89 ML/MIN/1.73
GLOBULIN UR ELPH-MCNC: 3.3 GM/DL
GLUCOSE BLD-MCNC: 89 MG/DL (ref 65–99)
HCT VFR BLD AUTO: 44.2 % (ref 40.1–51)
HGB BLD-MCNC: 15.2 G/DL (ref 13.7–17.5)
LYMPHOCYTES # BLD AUTO: 1.49 10*3/MM3 (ref 0.8–7)
LYMPHOCYTES NFR BLD AUTO: 32.7 % (ref 10–60)
MCH RBC QN AUTO: 30.9 PG (ref 26–34)
MCHC RBC AUTO-ENTMCNC: 34.4 G/DL (ref 31–37)
MCV RBC AUTO: 89.8 FL (ref 80–100)
MONOCYTES # BLD AUTO: 0.35 10*3/MM3 (ref 0–1)
MONOCYTES NFR BLD AUTO: 7.7 % (ref 0–13)
NEUTROPHILS # BLD AUTO: 2.61 10*3/MM3 (ref 1–11)
NEUTROPHILS NFR BLD AUTO: 57.4 % (ref 30–85)
PLATELET # BLD AUTO: 222 10*3/MM3 (ref 150–450)
PMV BLD AUTO: 10 FL (ref 6–12)
POTASSIUM BLD-SCNC: 4.7 MMOL/L (ref 3.5–5.2)
PROT SERPL-MCNC: 8.1 G/DL (ref 6–8.5)
RBC # BLD AUTO: 4.92 10*6/MM3 (ref 4.63–6.08)
SODIUM BLD-SCNC: 146 MMOL/L (ref 136–145)
WBC NRBC COR # BLD: 4.55 10*3/MM3 (ref 5–10)

## 2018-09-27 PROCEDURE — 99214 OFFICE O/P EST MOD 30 MIN: CPT | Performed by: NURSE PRACTITIONER

## 2018-09-27 PROCEDURE — 80053 COMPREHEN METABOLIC PANEL: CPT | Performed by: NURSE PRACTITIONER

## 2018-09-27 PROCEDURE — 85025 COMPLETE CBC W/AUTO DIFF WBC: CPT | Performed by: NURSE PRACTITIONER

## 2018-09-27 PROCEDURE — 36415 COLL VENOUS BLD VENIPUNCTURE: CPT | Performed by: NURSE PRACTITIONER

## 2018-09-28 NOTE — PROGRESS NOTES
Subjective   Tucker Henson is a 38 y.o. male who presents for f/u regarding epigastric pain and elevated liver enzymes.    He presented to the ER 9/8 with acute onset of epigastric pain and nausea. His CT scan abd/pelvis showed hepatomegaly with diffuse fat infiltration of the liver as well as mildly enlarged lymph nodes above the gastric cardia and within the lesser sac. His labs showed elevated liver enzymes.   He has completed a 2 weeks course of Protonix and states medication was helpful with resolution of abdominal pain. No nausea/vomiting. He has been able to return to a normal diet (although minimizing acidic foods).      Abdominal Pain   This is a new problem. The current episode started 1 to 4 weeks ago. The onset quality is sudden. The problem occurs daily. The problem has been resolved. The pain is located in the epigastric region. The quality of the pain is aching and burning. The abdominal pain does not radiate. Associated symptoms include nausea. Pertinent negatives include no anorexia, constipation, diarrhea, dysuria, fever, frequency, headaches or vomiting. The pain is aggravated by eating (states pain was worsened with drinking water). He has tried proton pump inhibitors for the symptoms. The treatment provided significant relief.        The following portions of the patient's history were reviewed and updated as appropriate: allergies, current medications, past social history and problem list.    Past Medical History:   Diagnosis Date   • Alcohol abuse    • Alcoholism (CMS/HCC)    • Chest pain    • Depression    • Generalized headaches    • Mood disorder (CMS/HCC)    • Palpitations    • PV (pityriasis versicolor)          Current Outpatient Prescriptions:   •  acamprosate (CAMPRAL) 333 MG EC tablet, Take 2 tablets by mouth 3 (Three) Times a Day., Disp: 180 tablet, Rfl: 1  •  naltrexone (DEPADE) 50 MG tablet, Take 1 tablet by mouth Every Night., Disp: 30 tablet, Rfl: 1  •  sertraline (ZOLOFT) 50 MG  "tablet, Take 2 tablets by mouth Daily., Disp: 45 tablet, Rfl: 1    No Known Allergies    Review of Systems   Constitutional: Negative for chills, fatigue, fever and unexpected weight change.   HENT: Negative for ear pain, nosebleeds, postnasal drip, sneezing and sore throat.    Eyes: Negative for pain and visual disturbance.   Respiratory: Negative for cough, chest tightness, shortness of breath and wheezing.    Cardiovascular: Negative for chest pain and leg swelling.   Gastrointestinal: Positive for abdominal pain and nausea. Negative for abdominal distention, anal bleeding, anorexia, constipation, diarrhea, rectal pain and vomiting.   Endocrine: Negative for cold intolerance, heat intolerance, polydipsia and polyuria.   Genitourinary: Negative for difficulty urinating, dysuria, frequency and urgency.   Musculoskeletal: Negative for back pain.   Skin: Negative for color change.   Allergic/Immunologic: Negative for immunocompromised state.   Neurological: Negative for dizziness, weakness and headaches.   Psychiatric/Behavioral: Negative for confusion. The patient is not nervous/anxious.        Objective   Vitals:    09/27/18 1416   BP: 122/80   BP Location: Left arm   Patient Position: Sitting   Cuff Size: Adult   Pulse: 97   SpO2: 98%   Weight: 80.7 kg (178 lb)   Height: 180.3 cm (71\")     Physical Exam   Constitutional: He appears well-developed and well-nourished. He is cooperative. He does not have a sickly appearance. He does not appear ill.   HENT:   Head: Normocephalic.   Right Ear: Hearing, tympanic membrane and external ear normal.   Left Ear: Hearing, tympanic membrane and external ear normal.   Nose: Nose normal. No mucosal edema, rhinorrhea, sinus tenderness or nasal deformity. Right sinus exhibits no maxillary sinus tenderness and no frontal sinus tenderness. Left sinus exhibits no maxillary sinus tenderness and no frontal sinus tenderness.   Mouth/Throat: Oropharynx is clear and moist and mucous " membranes are normal. Normal dentition.   Eyes: Conjunctivae and lids are normal. Right eye exhibits no discharge and no exudate. Left eye exhibits no discharge and no exudate.   Neck: Trachea normal. Carotid bruit is not present. No edema present. No thyroid mass present.   Cardiovascular: Regular rhythm, normal heart sounds and normal pulses.    No murmur heard.  Pulmonary/Chest: Breath sounds normal. No respiratory distress. He has no decreased breath sounds. He has no wheezes. He has no rhonchi. He has no rales.   Abdominal: Soft. Normal appearance and bowel sounds are normal. There is no tenderness. There is no rebound and no guarding.   Lymphadenopathy:        Head (right side): No submental, no submandibular, no tonsillar, no preauricular, no posterior auricular and no occipital adenopathy present.        Head (left side): No submental, no submandibular, no tonsillar, no preauricular, no posterior auricular and no occipital adenopathy present.   Neurological: He is alert.   Skin: Skin is warm, dry and intact. No cyanosis. Nails show no clubbing.       Assessment/Plan   Tucker was seen today for follow-up.    Diagnoses and all orders for this visit:    Epigastric pain  Comments:  improved since taking course of Protonix, continue to monitor    Elevated LFTs  Comments:  recheck levels today  Orders:  -     Comprehensive Metabolic Panel; Future  -     Comprehensive Metabolic Panel    Enlarged lymph nodes  Comments:  recheck CT scan abd/pelvis with patient  Orders:  -     CBC Auto Differential; Future  -     CBC Auto Differential    History of ETOH abuse    Reviewed results of labs and CT scan abd/pelvis from ER visit. We discussed his hx of ETOH abuse and the negative effects on his health. He has returned to AA and [facility] clinic to help with cessation of drinking (has not had anything to drink in several days). He does report increased anxiety due to pending divorce. We will recheck his lfts today and plan on  rechecking his CT in 3 months with a f/u appt to discuss. He is encouraged on his cessation of ETOH.

## 2018-10-12 ENCOUNTER — HOSPITAL ENCOUNTER (EMERGENCY)
Facility: HOSPITAL | Age: 38
Discharge: HOME | End: 2018-10-12
Attending: EMERGENCY MEDICINE | Admitting: EMERGENCY MEDICINE

## 2018-10-12 VITALS
WEIGHT: 160 LBS | SYSTOLIC BLOOD PRESSURE: 116 MMHG | HEART RATE: 100 BPM | RESPIRATION RATE: 16 BRPM | OXYGEN SATURATION: 98 % | DIASTOLIC BLOOD PRESSURE: 76 MMHG | BODY MASS INDEX: 22.4 KG/M2 | HEIGHT: 71 IN | TEMPERATURE: 96.8 F

## 2018-10-12 DIAGNOSIS — F10.920 ALCOHOLIC INTOXICATION WITHOUT COMPLICATION (HCC): Primary | ICD-10-CM

## 2018-10-12 LAB
ETHANOL BLD-MCNC: 311 MG/DL (ref 0–10)
ETHANOL UR QL: 0.31 %

## 2018-10-12 PROCEDURE — 80307 DRUG TEST PRSMV CHEM ANLYZR: CPT | Performed by: EMERGENCY MEDICINE

## 2018-10-12 PROCEDURE — 36415 COLL VENOUS BLD VENIPUNCTURE: CPT

## 2018-10-12 PROCEDURE — 99284 EMERGENCY DEPT VISIT MOD MDM: CPT

## 2018-10-12 NOTE — ED NOTES
"Patient came to nurses station, states, \"no one has seen me since I've been here, can I have some water.\" \"I wonder why the light is off, and I see a TV but it doesn't work.\"  Patient instructed that he was seen by his primary nurse, told that he cannot have anything to drink until evaluated by ED physician.  Patient asked to stay in bed due to unsteady gait.  Patient given call light and instructed how to use call light.       Wagner Ventura RN  10/12/18 4833    "

## 2018-10-12 NOTE — ED NOTES
Walked into pts room and pt had taken off monitor leads, pulse ox, and BP cuff. Explained to pt the importance of being monitored.     Peace Browne RN  10/12/18 1905

## 2018-10-12 NOTE — ED NOTES
"Patient came out to nurses station and asked nurse to come into the room.  Patient states, \"so are you going to give me water or what?\"  Patient notified that until seen by MD he couldn't have anything to drink.  Patient instructed on proper call light usage.  Patient verbalized understanding.  With unsteady gait patient got back in bed.      Wagner Ventura RN  10/12/18 2634    "

## 2018-10-12 NOTE — ED PROVIDER NOTES
EMERGENCY DEPARTMENT ENCOUNTER    CHIEF COMPLAINT  Chief Complaint: EtOH intoxication  History given by: patient  History limited by: nothing  Room Number: 10/10  PMD: Junior Davalos MD      HPI:  Pt is a 38 y.o. male who presents to the ED after his brother called the crisis line. Pt reports that he has been consuming EtOH heavily for the past three days with his last drink PTA. Pt denies wanting help for EtOH abuse at this time. Pt states that he has had treatment for EtOH abuse in the past. Pt denies EtOH withdrawal symptoms in the past. Pt denies SI, HI, fever, headache, abdominal pain, chest pain, shortness of air, blood in stool, dysuria, leg pain, leg swelling, nausea, vomiting, or diarrhea. Pt denies taking any medications on a daily basis. Pt denies smoking or drug use. There are no other complaints at this time.    Duration/Onset/Timing: three days/gradual/constant  Quality: EtOH intoxication  Intensity/Severity: moderate  Associated Symptoms: none specified  Aggravating or Alleviating Factors: none specified  Previous Episodes: Pt states that he has had treatment for EtOH abuse in the past. Pt denies EtOH withdrawal symptoms in the past.      PAST MEDICAL HISTORY  Active Ambulatory Problems     Diagnosis Date Noted   • Mood disorder (CMS/HCC)    • Depression    • Generalized headaches    • Chest pain    • Palpitations    • Depression with suicidal ideation 04/10/2018   • Suicidal ideations 04/10/2018     Resolved Ambulatory Problems     Diagnosis Date Noted   • No Resolved Ambulatory Problems     Past Medical History:   Diagnosis Date   • Alcohol abuse    • Alcoholism (CMS/HCC)    • Chest pain    • Depression    • Generalized headaches    • Mood disorder (CMS/HCC)    • Palpitations    • PV (pityriasis versicolor)        PAST SURGICAL HISTORY  Past Surgical History:   Procedure Laterality Date   • APPENDECTOMY N/A 11/09/2009    Dr. Paulina Titus       FAMILY HISTORY  Family History   Problem Relation  Age of Onset   • Hypertension Mother    • Alcohol abuse Mother    • Alcohol abuse Father    • Bipolar disorder Father    • ALS Paternal Grandfather    • Alcohol abuse Paternal Grandfather    • Depression Paternal Grandfather    • Alcohol abuse Brother    • Suicide Attempts Other        SOCIAL HISTORY  Social History     Social History   • Marital status:      Spouse name: N/A   • Number of children: N/A   • Years of education: N/A     Occupational History   • Not on file.     Social History Main Topics   • Smoking status: Former Smoker     Types: Cigarettes   • Smokeless tobacco: Never Used      Comment: Smokes a few cigarettes a day   • Alcohol use No      Comment: pt reports being a couple months sober. was vodka daily drinker.   • Drug use: Unknown   • Sexual activity: Defer     Other Topics Concern   • Not on file     Social History Narrative   • No narrative on file       ALLERGIES  Patient has no known allergies.    REVIEW OF SYSTEMS  Review of Systems   Constitutional: Negative.  Negative for fever.   HENT: Negative.  Negative for sore throat.    Eyes: Negative.    Respiratory: Negative.  Negative for cough and shortness of breath.    Cardiovascular: Negative.  Negative for chest pain and leg swelling.   Gastrointestinal: Negative.  Negative for abdominal pain, blood in stool, diarrhea, nausea and vomiting.   Genitourinary: Negative.  Negative for dysuria.   Musculoskeletal: Negative.  Negative for back pain and myalgias.   Skin: Negative.  Negative for rash.   Neurological: Negative.  Negative for headaches.   Psychiatric/Behavioral: Negative for suicidal ideas.        Negative for Homicidal ideas   All other systems reviewed and are negative.      PHYSICAL EXAM  ED Triage Vitals   Temp Heart Rate Resp BP SpO2   10/12/18 1749 10/12/18 1749 10/12/18 1749 10/12/18 1758 10/12/18 1749   96.8 °F (36 °C) (!) 136 18 123/89 97 %      Temp src Heart Rate Source Patient Position BP Location FiO2 (%)   10/12/18  1749 10/12/18 1749 -- -- --   Tympanic Monitor          Physical Exam   Constitutional: No distress.   HENT:   Head: Normocephalic. Head is with abrasion (to the right forehead and right face).   Mouth/Throat: Oropharynx is clear and moist.   Eyes: Conjunctivae are normal.   Cardiovascular: Normal rate and regular rhythm.    Pulmonary/Chest: Breath sounds normal. No respiratory distress.   Abdominal: There is no tenderness.   Musculoskeletal: He exhibits no edema or tenderness.   Neurological: He is alert.   Skin: No rash noted.   Nursing note and vitals reviewed.      LAB RESULTS  Lab Results (last 24 hours)     Procedure Component Value Units Date/Time    Ethanol [[id number]]  (Abnormal) Collected:  10/12/18 1952    Specimen:  Blood from Arm, Right Updated:  10/12/18 2034     Ethanol 311 (C) mg/dL      Ethanol % 0.311 %           I ordered the above labs and reviewed the results.    PROCEDURES  Procedures      PROGRESS AND CONSULTS  1928 Ordered EtOH level for further evaluation.    2044 Rechecked the patient who is resting comfortably and in NAD. His vital signs are stable. Discussed the EtOH of 311. Advised the patient that he will have to remain in the ED until he is sober which will be around 0400 tomorrow if he cannot find a sober ride home. The patient reports that he is going to contact family to see if someone can come pick him up.    2053 Patient ambulated to the ED desk. He reported that he has a sober ride to take him home. His vital signs are stable. He denies SI, HI, or any other symptoms at this time. He will be discharged home once the sober ride arrives to the ED. Pt understands and agrees with the plan, all questions answered.    MEDICAL DECISION MAKING  Results were reviewed/discussed with the patient and they were also made aware of online access. Pt also made aware that some labs, such as cultures, will not be resulted during ER visit and follow up with PMD is necessary.     MDM  Number of  Diagnoses or Management Options  Alcoholic intoxication without complication (CMS/HCC):      Amount and/or Complexity of Data Reviewed  Clinical lab tests: ordered and reviewed (EtOH: 311 at 1952)  Decide to obtain previous medical records or to obtain history from someone other than the patient: yes  Review and summarize past medical records: yes (The patient was seen in the ED on 04/09/18 for EtOH intoxication.)    Patient Progress  Patient progress: stable         DIAGNOSIS  Final diagnoses:   Alcoholic intoxication without complication (CMS/HCC)       DISPOSITION  DISCHARGE    Patient discharged in stable condition.    Reviewed implications of results, diagnosis, meds, responsibility to follow up, warning signs and symptoms of possible worsening, potential complications and reasons to return to ER, including any new or worsening symptoms.    Patient/Family voiced understanding of above instructions.    Discussed plan for discharge, as there is no emergent indication for admission. Patient referred to primary care provider for BP management due to today's BP. Pt/family is agreeable and understands need for follow up and repeat testing.  Pt is aware that discharge does not mean that nothing is wrong but it indicates no emergency is present that requires admission and they must continue care with follow-up as given below or physician of their choice.     FOLLOW-UP  Junior Davalos MD  [address] [address]  [phone]      Call for Appointment         Medication List      Stop    acamprosate 333 MG EC tablet  Commonly known as:  CAMPRAL     naltrexone 50 MG tablet  Commonly known as:  DEPADE     sertraline 50 MG tablet  Commonly known as:  ZOLOFT              Latest Documented Vital Signs:  As of 8:56 PM  BP- 126/82 HR- 98 Temp- 96.8 °F (36 °C) (Tympanic) O2 sat- 97%    --  Documentation assistance provided by madelaine Honeycutt for Dr. Sarah MD.  Information recorded by the madelaine  was done at my direction and has been verified and validated by me.       Angela Honeycutt  10/12/18 8190       Isidro Smith MD  10/12/18 5346